# Patient Record
Sex: FEMALE | Race: WHITE | NOT HISPANIC OR LATINO | Employment: OTHER | URBAN - METROPOLITAN AREA
[De-identification: names, ages, dates, MRNs, and addresses within clinical notes are randomized per-mention and may not be internally consistent; named-entity substitution may affect disease eponyms.]

---

## 2023-10-17 ENCOUNTER — ANCILLARY PROCEDURE (OUTPATIENT)
Dept: RADIOLOGY | Facility: CLINIC | Age: 68
End: 2023-10-17
Payer: COMMERCIAL

## 2023-10-17 DIAGNOSIS — M81.0 AGE-RELATED OSTEOPOROSIS WITHOUT CURRENT PATHOLOGICAL FRACTURE: ICD-10-CM

## 2023-10-17 PROCEDURE — 77080 DXA BONE DENSITY AXIAL: CPT

## 2023-10-17 PROCEDURE — 77080 DXA BONE DENSITY AXIAL: CPT | Performed by: RADIOLOGY

## 2023-10-22 PROBLEM — E78.2 HYPERLIPIDEMIA, MIXED: Status: ACTIVE | Noted: 2018-01-31

## 2023-10-22 PROBLEM — K76.0 FATTY LIVER: Status: ACTIVE | Noted: 2019-08-12

## 2023-10-22 PROBLEM — M17.11 ARTHRITIS OF KNEE, RIGHT: Status: ACTIVE | Noted: 2023-10-22

## 2023-10-22 PROBLEM — H35.3131 EARLY DRY STAGE NONEXUDATIVE AGE-RELATED MACULAR DEGENERATION OF BOTH EYES: Status: ACTIVE | Noted: 2023-10-06

## 2023-10-22 PROBLEM — E53.8 B12 DEFICIENCY: Status: ACTIVE | Noted: 2017-03-20

## 2023-10-22 PROBLEM — R73.09 ELEVATED HEMOGLOBIN A1C: Status: ACTIVE | Noted: 2018-01-25

## 2023-10-22 PROBLEM — H90.3 SENSORINEURAL HEARING LOSS, ASYMMETRICAL: Status: ACTIVE | Noted: 2022-06-20

## 2023-10-22 PROBLEM — C44.41 BASAL CELL CARCINOMA OF SKIN OF SCALP AND NECK: Status: ACTIVE | Noted: 2023-08-11

## 2023-10-22 PROBLEM — F41.1 GAD (GENERALIZED ANXIETY DISORDER): Status: ACTIVE | Noted: 2017-01-30

## 2023-10-22 PROBLEM — M65.4 DE QUERVAIN'S DISEASE (TENOSYNOVITIS): Status: ACTIVE | Noted: 2019-02-01

## 2023-10-22 PROBLEM — S83.249A TEAR OF MEDIAL MENISCUS OF KNEE: Status: ACTIVE | Noted: 2023-10-22

## 2023-10-22 PROBLEM — M85.80 OSTEOPENIA, SENILE: Status: ACTIVE | Noted: 2017-03-20

## 2023-10-22 PROBLEM — Z85.820 HISTORY OF MELANOMA: Status: ACTIVE | Noted: 2017-01-30

## 2023-10-22 RX ORDER — CYANOCOBALAMIN 1000 UG/ML
1000 INJECTION, SOLUTION INTRAMUSCULAR; SUBCUTANEOUS
COMMUNITY
Start: 2019-09-17

## 2023-10-22 RX ORDER — FLUTICASONE PROPIONATE AND SALMETEROL 250; 50 UG/1; UG/1
1 POWDER RESPIRATORY (INHALATION) 2 TIMES DAILY PRN
COMMUNITY
Start: 2019-02-19

## 2023-10-22 RX ORDER — PITAVASTATIN CALCIUM 2.09 MG/1
1 TABLET, FILM COATED ORAL
COMMUNITY
Start: 2021-09-10

## 2023-10-22 RX ORDER — SIMVASTATIN 20 MG/1
20 TABLET, FILM COATED ORAL NIGHTLY
COMMUNITY
Start: 2023-08-13

## 2023-10-22 RX ORDER — FEXOFENADINE HCL AND PSEUDOEPHEDRINE HCI 60; 120 MG/1; MG/1
TABLET, EXTENDED RELEASE ORAL
COMMUNITY

## 2023-10-22 RX ORDER — PHENAZOPYRIDINE HYDROCHLORIDE 200 MG/1
TABLET, FILM COATED ORAL
COMMUNITY
Start: 2019-04-11

## 2023-10-22 RX ORDER — ENALAPRIL MALEATE 20 MG/1
20 TABLET ORAL
COMMUNITY
Start: 2019-01-22

## 2023-10-22 RX ORDER — AMLODIPINE BESYLATE 5 MG/1
5 TABLET ORAL
COMMUNITY
Start: 2019-12-14

## 2023-10-22 RX ORDER — ATORVASTATIN CALCIUM 20 MG/1
TABLET, FILM COATED ORAL
COMMUNITY
Start: 2018-10-22

## 2023-10-22 RX ORDER — ESOMEPRAZOLE MAGNESIUM 40 MG/1
40 CAPSULE, DELAYED RELEASE ORAL
COMMUNITY
Start: 2018-10-22

## 2023-10-22 RX ORDER — MONTELUKAST SODIUM 10 MG/1
1 TABLET ORAL NIGHTLY
COMMUNITY
Start: 2019-11-18

## 2023-10-22 RX ORDER — OXYBUTYNIN CHLORIDE 5 MG/1
TABLET, EXTENDED RELEASE ORAL
COMMUNITY
Start: 2019-12-09

## 2023-10-22 RX ORDER — MONTELUKAST SODIUM 4 MG/1
1 TABLET, CHEWABLE ORAL 2 TIMES DAILY
COMMUNITY
Start: 2019-11-21

## 2023-10-22 RX ORDER — NEEDLES, FILTER 19GX1 1/2"
NEEDLE, DISPOSABLE MISCELLANEOUS
COMMUNITY
Start: 2023-09-06

## 2023-10-22 RX ORDER — ASPIRIN 81 MG/1
81 TABLET ORAL
COMMUNITY

## 2023-10-22 RX ORDER — ESCITALOPRAM OXALATE 10 MG/1
10 TABLET ORAL DAILY
COMMUNITY
Start: 2019-12-30

## 2023-10-22 RX ORDER — ALBUTEROL SULFATE 90 UG/1
AEROSOL, METERED RESPIRATORY (INHALATION)
COMMUNITY

## 2023-10-22 RX ORDER — MINERAL OIL
180 ENEMA (ML) RECTAL
COMMUNITY
Start: 2014-04-03

## 2023-10-22 RX ORDER — NITROFURANTOIN 25; 75 MG/1; MG/1
CAPSULE ORAL
COMMUNITY
Start: 2019-04-11

## 2023-10-22 RX ORDER — LEVALBUTEROL TARTRATE 45 UG/1
1-2 AEROSOL, METERED ORAL EVERY 4 HOURS PRN
COMMUNITY
Start: 2021-01-15

## 2023-10-22 RX ORDER — ACETAMINOPHEN AND PHENYLEPHRINE HCL 325; 5 MG/1; MG/1
TABLET ORAL
COMMUNITY

## 2023-10-22 RX ORDER — FLUTICASONE PROPIONATE 50 MCG
2 SPRAY, SUSPENSION (ML) NASAL DAILY
COMMUNITY
Start: 2018-10-22

## 2023-10-22 RX ORDER — CLOBETASOL PROPIONATE 0.46 MG/ML
SOLUTION TOPICAL
COMMUNITY
Start: 2019-09-09

## 2023-10-24 ENCOUNTER — EVALUATION (OUTPATIENT)
Dept: PHYSICAL THERAPY | Facility: CLINIC | Age: 68
End: 2023-10-24
Payer: COMMERCIAL

## 2023-10-24 VITALS — OXYGEN SATURATION: 96 % | DIASTOLIC BLOOD PRESSURE: 80 MMHG | SYSTOLIC BLOOD PRESSURE: 132 MMHG | HEART RATE: 78 BPM

## 2023-10-24 DIAGNOSIS — M76.891 OTHER SPECIFIED ENTHESOPATHIES OF RIGHT LOWER LIMB, EXCLUDING FOOT: ICD-10-CM

## 2023-10-24 PROCEDURE — 97530 THERAPEUTIC ACTIVITIES: CPT | Mod: GP

## 2023-10-24 PROCEDURE — 97162 PT EVAL MOD COMPLEX 30 MIN: CPT | Mod: GP

## 2023-10-24 ASSESSMENT — ACTIVITIES OF DAILY LIVING (ADL): ADL_ASSISTANCE: INDEPENDENT

## 2023-10-24 ASSESSMENT — PAIN - FUNCTIONAL ASSESSMENT: PAIN_FUNCTIONAL_ASSESSMENT: 0-10

## 2023-10-24 ASSESSMENT — PAIN SCALES - GENERAL: PAINLEVEL_OUTOF10: 2

## 2023-10-24 ASSESSMENT — PAIN DESCRIPTION - DESCRIPTORS: DESCRIPTORS: SHARP;DULL

## 2023-10-24 NOTE — PROGRESS NOTES
Physical Therapy    Physical Therapy Evaluation and Treatment      Patient Name: Charo Fleming  MRN: [id number]  Today's Date: 10/24/2023  Time Calculation  Start Time: 0840  Stop Time: 0920  Time Calculation (min): 40 min      Assessment:  67yr F pt presents to physical therapy with Complaints of increasing R Hip pain, L Sciatica, and occasional low back pain. During examination patient has deficits in B LE strength, pain with ROM, gait dysfunction, stair dysfunction, dynamic balance, impaired functional mobility and tolerance with activity. pt would benefit from continued skilled physical therapy to maximize pt safety, increase tolerance to activity and to address impairments to restore PLOF with ADLs, functional mobility and participation in activities.    PT Assessment Results: Decreased strength, Decreased endurance, Impaired balance, Decreased mobility, Decreased coordination, Pain  Rehab Prognosis: Good  Evaluation/Treatment Tolerance: Patient tolerated treatment well  Strengths: Ability to acquire knowledge    Plan:  Treatment/Interventions: Biofeedback, Cryotherapy, Education/ Instruction, Gait training, Manual therapy, Neuromuscular re-education, Self care/ home management, Therapeutic activities, Therapeutic exercises  PT Plan: Skilled PT, Other (Comment) (Start on Proximal Hip Strengthening; HEP for Hip; Sciatica Nerve Stretch/Glides, Pirformis Stretch, Nustep)  PT Frequency: 2 times per week  Duration: 4  Rehab Potential: Good  Plan of Care Agreement: Patient  Visit #1   Insurance Reviewed (per information provided by  pre-cert team)  Authorization required:  No   Preferred Name:  Charo     Current Problem:   1. Other specified enthesopathies of right lower limb, excluding foot  Referral to Physical Therapy    Follow Up In Physical Therapy          Subjective    General:  General  Reason for Referral: R Hip Pain  Referred By: MD Brett  Past Medical History Relevant to Rehab: CA Melanoma, HTN,  Asthma, CVA, HA, Migraines,  Preferred Learning Style: visual, verbal, written  General Comment: Reports when she was walking she has R/L hip pain for some time with walking but recently in October she has had increased level of pain in R LE and then a week after she saw her PCP (10/6/23) she stated the sciatic nerve started to act up in the L Hip. No falls in the last 6 months. At worst 9/10 pain with climbing the staris; later in the day; at its best 1-2/10 pain level. 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  Precautions:  Precautions  Medical Precautions: No known precautions/limitation  Vital Signs:  Vital Signs  Heart Rate: 78  Heart Rate Source: Monitor  SpO2: 96 %  BP: 132/80  BP Location: Right arm  BP Method: Automatic  Patient Position: Sitting  Pain:  Pain Assessment  Pain Assessment: 0-10  Pain Score: 2  Pain Type: Chronic pain  Pain Location: Hip  Pain Orientation: Right  Pain Descriptors: Sharp, Dull  Pain Frequency: Constant/continuous  Clinical Progression: Not changed  Home Living:   Split Level Home: 1 step to enter the home; 6 steps in home; bathroom is on main level; bedroom is up 6 steps; laundry is 6 steps to basement.   Prior Level of Function:  Prior Function Per Pt/Caregiver Report  Level of [place]: Independent with ADLs and functional transfers, Independent with homemaking with ambulation  Receives Help From: Family, Other (Comment) (Able to assist as needed)  ADL Assistance: Independent  Homemaking Assistance: Independent  Ambulatory Assistance: Independent  Vocational: Retired  Leisure: Takes care of [employer]  Hand Dominance: Right  Prior Function Comments: IND    Objective   General Assessments:  Posture Comment: Fwd head, rounded shoulders, increased  thoracic kyphosis    Functional Assessments:  Gait Comment: Ambulates for 150+ ft with a mild antalgic gait; slow allie, decreased push off and heel strike on R LE. No AD used.    , Stairs Comment: Ascends/Descends steps with BL use of HR with a step over step pattern; slow allie  , Bed Mobility Comment: IND  , and Transfers Comment: IND  Extremity/Trunk Assessments:  RLE   RLE : Exceptions to WFL  Strength RLE  R Hip Flexion: 4+/5  R Hip Extension: 4/5  R Hip ABduction: 4-/5  R Hip ADduction: 4-/5  R Knee Flexion: 4+/5  R Knee Extension: 4+/5  R Ankle Dorsiflexion: 5/5  R Ankle Plantar Flexion: 5/5  LLE   LLE : Exceptions to WFL  Strength LLE  L Hip Flexion: 4+/5  L Hip Extension: 4/5  L Hip ABduction: 4-/5  L Hip ADduction: 4-/5  L Knee Flexion: 4+/5  L Knee Extension: 4+/5  L Ankle Dorsiflexion: 5/5  L Ankle Plantar Flexion: 5/5    HIP  Hip Palpation/Joint Mobility Assessment  Palpation / Joint Mobility Comment: L Side Hip; Greater Trochanter region tender with palpation; R Side no tenderness  Lumbar AROM WFL unless documented below  Lumbar AROM WFL: no  Lumbar flexion: (60°): WNL; Pain with flexion of L/S in the L Hip  Lumbar extension (25°): WNL Motion: Pain on the L Hip/Glutes  Lumbar rotation right (30°): WNL  Lumbar roation left (30°): WNL  Lumbar sidebend right (25°): WNL  Lumbar sidebend left (25°): WNL  Hip AROM WFL unless documented below     Hip PROM WFL unless documented below     Specific Lower Extremity MMT WFL unless documented below  Specific Lower Extremity MMT WFL: no  R Gluteals (prone): (5/5): 4/5  L Gluteals (prone): (5/5): 4/5  R Hip External Rotation: (5/5): 4-/5  L Hip External Rotation: (5/5): 4-/5    Special Tests Negative unless documented below  Special Tests Negative: no  Supine SLR: (Negative): (+) on L LE >90 degrees  NASRA: (Negative): (+) BL  Other: Scour Test (-) BL       Outcome Measures:  FGA - Functional Gait Assessment  Gait level surface: 3  Change in gait speed: 3  Gait  with horizontal head turns: 3  Gait with vertical head turns: 3  Gait and pivot turn: 3  Step over obstacle: 3  Gait with narrow base of support: 2  Gait with eyes closed: 3  Ambulating backwards: 3  Steps: 2  FGA Total Score: 28    Timed Up and Go Test  TUG Comment: 5.83, no AD    Other Measures  5x Sit to Stand: 10.97s no UE assist; Standardized chair  Functional Gait Assessment (FGA): 28/30  Lower Extremity Funtional Score (LEFS): 36/80     Treatments:  Therapeutic Activity  Therapeutic Activity Performed: Yes (20 minutes)  1. Repeated sit to/from stands from standardized chair height. Required VC for no use of hands, nose over toes, full upright stand from chair, use of anterior shift with fwd momentum and eccentric control into chair.  2. Functional Performance via gait analysis of TUG: Verbal cues for sequencing/positioning. 2x10' at SBA.   3. Functional mobility via AROM/PROM of LE; Verbal cues for sequencing/positioning.  4. Functional Performance via MMT of LE: Hip, knee, ankle; Verbal cues for sequencing/positioning.   5. Functional performance via Stairs: Ascends/Descends 4 6in steps with UL of HR   6. Educated pt on POC, goals of physical therapy, purpose of physical therapy, as well as the benefits in participating in physical therapy to increase functional mobility and maximize pt safety.     OP EDUCATION:  Education  Individual(s) Educated: Patient  Education Provided: Anatomy, Fall Risk, Body Mechanics, Home Exercise Program, POC, Posture  Risk and Benefits Discussed with Patient/Caregiver/Other: yes  Patient/Caregiver Demonstrated Understanding: yes  Plan of Care Discussed and Agreed Upon: yes  Patient Response to Education: Patient/Caregiver Verbalized Understanding of Information, Patient/Caregiver Performed Return Demonstration of Exercises/Activities, Patient/Caregiver Asked Appropriate Questions    Goals: 10/24/23  STG: pt will be independent in HEP & symptom management    LTGs:  Pain: pt will  demonstrate a 2 pt improvement for hip pain on the VAS scale, allowing for improved tolerance to perform daily functional activities.     ROM: pt will demonstrate no losses in hip AROM without onset of pain, allowing for a normal gait pattern.     Strength: Pt will improve B LE Strength to >/= 4+/5 to increase functional performance, tolerance to activity, and enhancing pt safety to particpate in ADLs and IADLs.    Gait: pt will demonstrate normal mechanics without pain during gait and performance of stairs, allowing for pt to return to navigating the community.     LEFS: pt will improve LEFS score by at least 9 points (minimal clinically important difference) in order to reflect increased ease in completing ADL's/IADL's.  Baseline 36/80    FGA: pt will increase FGA to >/= 23/30 to decrease fall risk and improve safety/IND at home. The Functional Gait Assessment(FGA) is 10-item test that assesses dynamic balance and postural stability during gait.  It is used to assessed the following diagnoses: Stroke, Parkinson's Disease, SCI's, Vestibular Disorders, and Geriatrics.  A score of < 22/30 indicates Increased fall risk. Baseline 28/30    TUG: pt will complete TUG within 12 seconds or less (indicative of higher fall risk) in order to INC balance and enhance safety during ADLs/ IADLs. (> or equal to 12 seconds indicates high risk for falls in older adults. 11-20 seconds is normal for the frail and elderly.) OR MCID 3.4s Baseline 5.83sec    5xSTS: pt will perform 5x sit to  < 15 seconds to decrease fall risk. OR MCID 2.3s Baseline 10.97sec    Stairs: pt will ascend/descend step over step (6in step) with proper gait mechanics and use of <1HR to promote functional mobility and improve functional performance.

## 2023-10-24 NOTE — LETTER
October 24, 2023     Patient: Charo Fleming   YOB: 1955   Date of Visit: 10/24/2023       To Whom It May Concern:    It is my medical opinion that Charo Fleming {Work release (duty restriction):33480}.    If you have any questions or concerns, please don't hesitate to call.         Sincerely,        Mei Warner, PT    CC: No Recipients

## 2023-10-24 NOTE — LETTER
October 24, 2023     Patient: Charo Fleming   YOB: 1955   Date of Visit: 10/24/2023       To Whom it May Concern:    Charo Fleming was seen in my clinic on 10/24/2023. She {Return to school/sport:52688}.    If you have any questions or concerns, please don't hesitate to call.         Sincerely,          Mei Warner, PT        CC: No Recipients

## 2023-10-27 ENCOUNTER — TREATMENT (OUTPATIENT)
Dept: PHYSICAL THERAPY | Facility: CLINIC | Age: 68
End: 2023-10-27
Payer: COMMERCIAL

## 2023-10-27 DIAGNOSIS — M76.891 OTHER SPECIFIED ENTHESOPATHIES OF RIGHT LOWER LIMB, EXCLUDING FOOT: ICD-10-CM

## 2023-10-27 PROCEDURE — 97110 THERAPEUTIC EXERCISES: CPT | Mod: GP

## 2023-10-27 ASSESSMENT — PAIN - FUNCTIONAL ASSESSMENT: PAIN_FUNCTIONAL_ASSESSMENT: 0-10

## 2023-10-27 ASSESSMENT — PAIN DESCRIPTION - DESCRIPTORS: DESCRIPTORS: SHARP

## 2023-10-27 ASSESSMENT — PAIN SCALES - GENERAL: PAINLEVEL_OUTOF10: 5 - MODERATE PAIN

## 2023-10-27 NOTE — PROGRESS NOTES
Physical Therapy    Physical Therapy Progress Note    Patient Name: Charo Fleming  MRN: [id number]  Today's Date: 10/27/2023  Time Calculation  Start Time: 1103  Stop Time: 1147  Time Calculation (min): 44 min      Assessment:  PT Assessment  PT Assessment Results: Decreased strength, Decreased endurance, Impaired balance, Decreased mobility, Decreased coordination, Pain  Rehab Prognosis: Good  Evaluation/Treatment Tolerance: Patient tolerated treatment well  pt tolerated treatment well, did well with initiation of HEP and Supine Proximal Hip Strengthening. Minimal pain with SLR during exercises but manageable. pt needs continued work on increasing tolerance with activity, dynamic hip strengthening. pt left session with all questions answered.     Plan:  OP PT Plan  Treatment/Interventions: Biofeedback, Cryotherapy, Education/ Instruction, Gait training, Manual therapy, Neuromuscular re-education, Self care/ home management, Therapeutic activities, Therapeutic exercises  PT Plan: Skilled PT, Other (Comment)  PT Frequency: 2 times per week  Duration: 4  Rehab Potential: Good  Plan of Care Agreement: Patient  Visit #2  Insurance Reviewed (per information provided by  pre-cert team)  Authorization required:  No   Preferred Name:  Charo     Current Problem  1. Other specified enthesopathies of right lower limb, excluding foot  Follow Up In Physical Therapy          Subjective   General  Reason for Referral: R Hip Pain  Referred By: MD Brett  Past Medical History Relevant to Rehab: CA Melanoma, HTN, Asthma, CVA, HA, Migraines,  Preferred Learning Style: visual, verbal, written  General Comment: Reports after last session she iced as recomended and was a little flared up; reports the grandkids were over she was tryingto work on better body mechanics but was going up and down a lot of stairs which caused an increased flare up. reports she is already startting to feel it today since she has been up for a  while.  Precautions  Precautions  Medical Precautions: No known precautions/limitation    Pain  Pain Assessment: 0-10  Pain Score: 5 - Moderate pain  Pain Type: Chronic pain  Pain Location: Hip  Pain Orientation: Right, Left  Pain Descriptors: Sharp  Pain Frequency: Constant/continuous  Clinical Progression: Not changed    Objective   Posture  Posture Comment: Rounded shoulders, fwd head, increased thoracic kyphosis    Gait: Ambulates for 50+ ft with a mild antalgic gait; slow allie, decreased push off and heel strike on R LE. No AD used     Treatments:  Therapeutic Exercise  Therapeutic Exercise Performed: Yes  Therapeutic Exercise Activity 1: Nustep work level 3 to increase functional mobility and increase tolerance with activity  Therapeutic Exercise Activity 2: Supine Bridges 3x10  Therapeutic Exercise Activity 3: Adductor Ball Squeezes 2x15; 2-3s hold  Therapeutic Exercise Activity 4: SLR BL 2x15  Therapeutic Exercise Activity 5: PPT 3x10  Therapeutic Exercise Activity 6: Clamshells BL 2x15  Therapeutic Exercise Activity 7: SL Hip BL ABD 2x15  Therapeutic Exercise Activity 8: Slant Board Stretch 2x20s  Therapeutic Exercise Activity 9: HS Stretch with Steps 2x30s    OP EDUCATION:  Education  Individual(s) Educated: Patient  Education Provided: Anatomy, Fall Risk, Body Mechanics, Home Exercise Program, POC, Posture  Risk and Benefits Discussed with Patient/Caregiver/Other: yes  Patient/Caregiver Demonstrated Understanding: yes  Plan of Care Discussed and Agreed Upon: yes  Patient Response to Education: Patient/Caregiver Verbalized Understanding of Information, Patient/Caregiver Performed Return Demonstration of Exercises/Activities, Patient/Caregiver Asked Appropriate Questions    HEP  Access Code: [email or web address]  URL: [email or web address]  Date: 10/27/2023  Prepared by: Mei Warner    Exercises  - Supine Posterior Pelvic Tilt  - 1 x daily - 3 sets - 10 reps  - Supine Bridge  - 1 x daily - 3  sets - 10 reps  - Clamshell  - 1 x daily - 3 sets - 10 reps  - Sidelying Hip Abduction  - 1 x daily - 3 sets - 10 reps  - Supine Active Straight Leg Raise  - 1 x daily - 3 sets - 10 reps  - Supine Hip Adduction Isometric with Ball  - 1 x daily - 3 sets - 10 reps    Billing:Minutes  Treatment:   Therapeutic Exercise:  40

## 2023-10-31 ENCOUNTER — TREATMENT (OUTPATIENT)
Dept: PHYSICAL THERAPY | Facility: CLINIC | Age: 68
End: 2023-10-31
Payer: COMMERCIAL

## 2023-10-31 DIAGNOSIS — M76.891 OTHER SPECIFIED ENTHESOPATHIES OF RIGHT LOWER LIMB, EXCLUDING FOOT: ICD-10-CM

## 2023-10-31 PROCEDURE — 97110 THERAPEUTIC EXERCISES: CPT | Mod: GP

## 2023-10-31 ASSESSMENT — PAIN DESCRIPTION - DESCRIPTORS: DESCRIPTORS: SHARP

## 2023-10-31 ASSESSMENT — PAIN SCALES - GENERAL: PAINLEVEL_OUTOF10: 7

## 2023-10-31 ASSESSMENT — PAIN - FUNCTIONAL ASSESSMENT: PAIN_FUNCTIONAL_ASSESSMENT: 0-10

## 2023-10-31 NOTE — PROGRESS NOTES
Physical Therapy    Physical Therapy Progress Note    Patient Name: Charo Fleming  MRN: [id number]  Today's Date: 10/31/2023  Time Calculation  Start Time: 1445  Stop Time: 1525  Time Calculation (min): 40 min  Billing: Minutes  Treatment:   Therapeutic Exercise:  38    Assessment:  PT Assessment  PT Assessment Results: Decreased strength, Decreased endurance, Impaired balance, Decreased mobility, Decreased coordination, Pain  Rehab Prognosis: Good  Evaluation/Treatment Tolerance: Patient tolerated treatment well  pt tolerated treatment well, did well with supine mat table exercises and new additional stretches to the LE . pt needs continued work on increasing tolerance with activity, gait, and LE strength. pt left session with all questions answered.     Plan:  OP PT Plan  Treatment/Interventions: Biofeedback, Cryotherapy, Education/ Instruction, Gait training, Manual therapy, Neuromuscular re-education, Self care/ home management, Therapeutic activities, Therapeutic exercises  PT Plan: Skilled PT, Other (Comment) (Continue to work on proximal hip muscle strength and progress to standing if tolerated and no flare ups since last visit)  PT Frequency: 2 times per week  Duration: 4  Rehab Potential: Good  Plan of Care Agreement: Patient  Visit #3  Insurance Reviewed (per information provided by  pre-cert team)  Authorization required:  No   Preferred Name:  Charo     Current Problem  1. Other specified enthesopathies of right lower limb, excluding foot  Follow Up In Physical Therapy          Subjective   General  Reason for Referral: R Hip Pain  Referred By: MD Brett  Past Medical History Relevant to Rehab: CA Melanoma, HTN, Asthma, CVA, HA, Migraines,  Preferred Learning Style: visual, verbal, written  General Comment: Reports her hip felt the best its been yesterday and then she was going up and down stairs and taking care of grandkids which caused a flare up; reports today she has pain and thinks it is  because she has been walking and on her feet today.  Precautions  Precautions  Medical Precautions: No known precautions/limitation  Pain  Pain Assessment: 0-10  Pain Score: 7  Pain Type: Chronic pain, Neuropathic pain  Pain Location: Hip  Pain Orientation: Left (Sciatica)  Pain Descriptors: Sharp  Pain Frequency: Constant/continuous    Objective   Posture  Posture Comment: Rounded shoulders, fwd head, increased thoracic kyphosis    Gait: Ambulates in clinic for 50+ ft with a antalgic gait pattern; decreased loading on the L LE; decreased psuh off and initial contact on the L LE; R trunk lean minimal to ensure proper clearance during swing.     Treatments:  Therapeutic Exercise  Therapeutic Exercise Performed: Yes  Therapeutic Exercise Activity 1: Nustep work level 3 to increase functional mobility and increase tolerance with activity  Therapeutic Exercise Activity 2: Supine Bridges 2x15  Therapeutic Exercise Activity 3: Adductor Ball Squeezes 2x15; 2-3s hold  Therapeutic Exercise Activity 4: SLR BL 2x15  Therapeutic Exercise Activity 5: PPT x20  Therapeutic Exercise Activity 6: Long Sit Hip Flexion BL 3x5  Therapeutic Exercise Activity 7: Pirformis Fig 4 Stretch BL 2x30s  Therapeutic Exercise Activity 8: Supine Nerve Glide Sciatic: L LE x10  Therapeutic Exercise Activity 9: Supine HS Stretch with Steps  R LE 3x30s  Therapeutic Exercise Activity 10: Gastroc/Soleus Stretch BL 2x30s    OP EDUCATION:  Education  Individual(s) Educated: Patient  Education Provided: Anatomy, Fall Risk, Body Mechanics, Home Exercise Program, POC, Posture  Risk and Benefits Discussed with Patient/Caregiver/Other: yes  Patient/Caregiver Demonstrated Understanding: yes  Plan of Care Discussed and Agreed Upon: yes  Patient Response to Education: Patient/Caregiver Verbalized Understanding of Information, Patient/Caregiver Performed Return Demonstration of Exercises/Activities, Patient/Caregiver Asked Appropriate Questions    HEP  Access Code:  [email or web address]  URL: [email or web address]  Date: 10/31/2023  Prepared by: Mei Warner    Exercises  - Supine Piriformis Stretch with Foot on Ground  - 1 x daily - 7 x weekly - 3 sets - 10 reps  - Supine Sciatic Nerve Glide  - 1 x daily - 1-2 sets - 10 reps  - Supine Hamstring Stretch with Strap  - 1 x daily - 3-5 sets - 20-30s hold

## 2023-11-03 ENCOUNTER — TREATMENT (OUTPATIENT)
Dept: PHYSICAL THERAPY | Facility: CLINIC | Age: 68
End: 2023-11-03
Payer: COMMERCIAL

## 2023-11-03 DIAGNOSIS — M76.891 OTHER SPECIFIED ENTHESOPATHIES OF RIGHT LOWER LIMB, EXCLUDING FOOT: ICD-10-CM

## 2023-11-03 PROCEDURE — 97110 THERAPEUTIC EXERCISES: CPT | Mod: GP

## 2023-11-03 ASSESSMENT — PAIN DESCRIPTION - DESCRIPTORS: DESCRIPTORS: SHARP

## 2023-11-03 ASSESSMENT — PAIN SCALES - GENERAL: PAINLEVEL_OUTOF10: 4

## 2023-11-03 ASSESSMENT — PAIN - FUNCTIONAL ASSESSMENT: PAIN_FUNCTIONAL_ASSESSMENT: 0-10

## 2023-11-03 NOTE — PROGRESS NOTES
Physical Therapy    Physical Therapy Progress Note    Patient Name: Charo Fleming  MRN: [id number]  Today's Date: 11/3/2023  Time Calculation  Start Time: 0800  Stop Time: 0838  Time Calculation (min): 38 min  Billing: Minutes   Treatment:   Therapeutic Exercise:  38    Assessment:  PT Assessment  PT Assessment Results: Decreased strength, Decreased endurance, Impaired balance, Decreased mobility, Decreased coordination, Pain  Rehab Prognosis: Good  Evaluation/Treatment Tolerance: Patient tolerated treatment well  pt tolerated treatment well, did well with progression of strengthening hip proximal exercises. pt needs continued work on hip strengthening as seen with difficulty with hip flexion in long sitting. pt left session with all questions answered.     Plan:  OP PT Plan  Treatment/Interventions: Biofeedback, Cryotherapy, Education/ Instruction, Gait training, Manual therapy, Neuromuscular re-education, Self care/ home management, Therapeutic activities, Therapeutic exercises  PT Plan: Skilled PT, Other (Comment) Work on Standing Proximal Hip Exercises; Slider Drills if tolerable; step ups, hip hikes  PT Frequency: 2 times per week  Duration: 4  Rehab Potential: Good  Plan of Care Agreement: Patient  Visit #4  Insurance Reviewed (per information provided by  pre-cert team)  Authorization required:  No   Preferred Name:  Charo       Current Problem  1. Other specified enthesopathies of right lower limb, excluding foot  Follow Up In Physical Therapy          Subjective   General  Reason for Referral: R Hip Pain  Referred By: MD Brett  Past Medical History Relevant to Rehab: CA Melanoma, HTN, Asthma, CVA, HA, Migraines,  Preferred Learning Style: visual, verbal, written  General Comment: Reports her hip was not too bad yesterday. Her hip hurt really bad after she was sitting in the car for 40 minutes and had increased pain but it worked itself out once she started to move  again.  Precautions  Precautions  Medical Precautions: No known precautions/limitation    Pain  Pain Assessment: 0-10  Pain Score: 4  Pain Type: Chronic pain, Neuropathic pain  Pain Location: Hip  Pain Orientation: Left  Pain Descriptors: Sharp  Pain Frequency: Constant/continuous    Objective   Posture  Posture Comment: Rounded shoulders, fwd head, increased thoracic kyphosis    Gait: Ambulates with a minimal antalgic gait, decreased allie, decreased push off on the L LE.     Treatments:  Therapeutic Exercise  Therapeutic Exercise Performed: Yes  Therapeutic Exercise Activity 1: Nustep work level 3 to increase functional mobility and increase tolerance with activity; 8 minutes  Therapeutic Exercise Activity 2: Supine Bridges 2x10  Therapeutic Exercise Activity 3: Adductor Ball Squeezes 2x15; 2-3s hold  Therapeutic Exercise Activity 4: SLR BL 2x15  Therapeutic Exercise Activity 5: PPT 2x20  Therapeutic Exercise Activity 6: Long Sit Hip Flexion BL 2x10; red ball as tactile cue  Therapeutic Exercise Activity 7: Pirformis Fig 4 Stretch BL 2x30s  Therapeutic Exercise Activity 8: Mini Squats at //bar 2x15  Therapeutic Exercise Activity 9: Stair HS Stretch BL 2x30s  Therapeutic Exercise Activity 10: Gastroc/Soleus Stretch BL 2x30s  Therapeutic Exercise Activity 11: SL Hip Circles x10 CW, x10 CCW    OP EDUCATION:  Education  Individual(s) Educated: Patient  Education Provided: Anatomy, Fall Risk, Body Mechanics, Home Exercise Program, POC, Posture  Risk and Benefits Discussed with Patient/Caregiver/Other: yes  Patient/Caregiver Demonstrated Understanding: yes  Plan of Care Discussed and Agreed Upon: yes  Patient Response to Education: Patient/Caregiver Verbalized Understanding of Information, Patient/Caregiver Performed Return Demonstration of Exercises/Activities, Patient/Caregiver Asked Appropriate Questions    HEP  Access Code: [email or web address]  URL: [email or web address]  Date: 11/03/2023  Prepared by:  Mei Warner    Exercises  - Sidelying Hip Circles  - 1 x daily - 7 x weekly - 3 sets - 10 reps

## 2023-11-08 ENCOUNTER — TREATMENT (OUTPATIENT)
Dept: PHYSICAL THERAPY | Facility: CLINIC | Age: 68
End: 2023-11-08
Payer: COMMERCIAL

## 2023-11-08 DIAGNOSIS — M76.891 OTHER SPECIFIED ENTHESOPATHIES OF RIGHT LOWER LIMB, EXCLUDING FOOT: ICD-10-CM

## 2023-11-08 PROCEDURE — 97110 THERAPEUTIC EXERCISES: CPT | Mod: GP,CQ

## 2023-11-08 ASSESSMENT — PAIN - FUNCTIONAL ASSESSMENT: PAIN_FUNCTIONAL_ASSESSMENT: 0-10

## 2023-11-08 ASSESSMENT — PAIN SCALES - GENERAL: PAINLEVEL_OUTOF10: 4

## 2023-11-08 ASSESSMENT — PAIN DESCRIPTION - DESCRIPTORS: DESCRIPTORS: SHARP

## 2023-11-08 NOTE — PROGRESS NOTES
Physical Therapy    Physical Therapy Progress Note    Patient Name: Charo Fleming  MRN: [id number]  Today's Date: 11/8/2023  Time Calculation  Start Time: 0915  Stop Time: 1010  Time Calculation (min): 55 min  Billing: Minutes   Treatment:   Therapeutic Exercise:  45  CP 10'/NB    Assessment:   Progressed patient with resistance added to clamshells and hooklying abduction.  Reviewed PPT technique and maintaining core engagement with supine DLS ex.  Challenged with long sit SLR, but working on at home.  V/c for proper form with squats in efforts to isolate target musculature specific to the goal of each ex.  Fatigued at end of session.  Relief with CP  Continues to require skilled PT to work on deficits with mobility, strength, endurance, and gait/stair activity.    Skilled Care;  Therapeutic exercise progression, patient education    Plan:  OP PT Plan  Treatment/Interventions: Biofeedback, Cryotherapy, Education/ Instruction, Gait training, Manual therapy, Neuromuscular re-education, Self care/ home management, Therapeutic activities, Therapeutic exercises  Add eccentric sliders and step ups next visit.     PT Frequency: 2 times per week  Duration: 4  Rehab Potential: Good  Plan of Care Agreement: Patient  Visit #5  Insurance Reviewed (per information provided by  pre-cert team)  Authorization required:  No   Preferred Name:  Charo       Current Problem  1. Other specified enthesopathies of right lower limb, excluding foot  Follow Up In Physical Therapy          Subjective   Reports Sunday her R. Hip flared up where she could not ambulate up the stairs.  States she can descend steps without pain, but ascending always hurts.    Reason for Referral: R Hip Pain  Referred By: MD Brett  Past Medical History Relevant to Rehab: CA Melanoma, HTN, Asthma, CVA, HA, Migraines,  Precautions   Medical Precautions: No known precautions/limitation     Pain  Pain Assessment: 0-10  Pain Score: 4  Pain Type: Chronic pain  Pain  "Location: Hip  Pain Orientation: Right, Left  Pain Descriptors: Sharp  R. Lateral hip 3/10, L. Sciatic pain 4/10, radiating down posterior L. hamstring    Objective   Posture:  Forward head, rounded shoulders  Gait: Ambulates with a minimal antalgic gait, decreased allie, decreased push off on the L LE.     Treatments:  Therapeutic Exercise  Therapeutic Exercise Performed: Yes  Therapeutic Exercise Activity 1: Nustep work level 3 to increase functional mobility and increase tolerance with activity; 8 minutes  Therapeutic Exercise Activity 2: Supine Bridges 2x10  Therapeutic Exercise Activity 3: Adductor Ball Squeezes 2x15; 2-3s hold  Therapeutic Exercise Activity 4: SLR BL 2x15  Therapeutic Exercise Activity 5: PPT 2x20  Therapeutic Exercise Activity 6: Long Sit Hip Flexion BL 2x10; red ball as tactile cue  Therapeutic Exercise Activity 7: Pirformis Fig 4 Stretch BL 2x30s  Therapeutic Exercise Activity 8: Mini Squats at //bar 2x15  Therapeutic Exercise Activity 9: Stair HS Stretch BL 2x30s  Therapeutic Exercise Activity 10: Gastroc/Soleus Stretch BL 2x30s  Therapeutic Exercise Activity 11: SL Hip Circles x10 CW, x10 CCW  Piriformis stretch L. LE only with manual assist 20\" x 3  RTB hooklying hip abduction 2 x 10  RTB clamshells 2 x 10 leobardo    Modalities  CP applied to L. Glute, R. Hip  in supine with wedge elevating leobardo LE's x 10' at end of session    OP EDUCATION:   V/c for correct technique and posture with exercises    HEP  Access Code: [email or web address]      "

## 2023-11-10 ENCOUNTER — TREATMENT (OUTPATIENT)
Dept: PHYSICAL THERAPY | Facility: CLINIC | Age: 68
End: 2023-11-10
Payer: COMMERCIAL

## 2023-11-10 DIAGNOSIS — M76.891 OTHER SPECIFIED ENTHESOPATHIES OF RIGHT LOWER LIMB, EXCLUDING FOOT: ICD-10-CM

## 2023-11-10 PROCEDURE — 97112 NEUROMUSCULAR REEDUCATION: CPT | Mod: GP

## 2023-11-10 PROCEDURE — 97110 THERAPEUTIC EXERCISES: CPT | Mod: GP

## 2023-11-10 ASSESSMENT — PAIN - FUNCTIONAL ASSESSMENT: PAIN_FUNCTIONAL_ASSESSMENT: 0-10

## 2023-11-10 ASSESSMENT — PAIN SCALES - GENERAL: PAINLEVEL_OUTOF10: 2

## 2023-11-10 ASSESSMENT — PAIN DESCRIPTION - DESCRIPTORS: DESCRIPTORS: ACHING

## 2023-11-10 NOTE — PROGRESS NOTES
Physical Therapy    Physical Therapy Progress Note    Patient Name: Charo Fleming  MRN: [id number]  Today's Date: 11/10/2023  Time Calculation  Start Time: 0834  Stop Time: 0912  Time Calculation (min): 38 min  Billing:  Treatment:   Therapeutic Exercise:  23  NMR:  15    Assessment:  pt tolerated treatment well, did well with progression of slider drills and step ups/step downs although still challenging. pt needs continued work on increasing proximal hip strength and balance. pt left session with all questions answered.   PT Assessment  PT Assessment Results: Decreased strength, Decreased endurance, Impaired balance, Decreased mobility, Decreased coordination, Pain  Rehab Prognosis: Good  Evaluation/Treatment Tolerance: Patient tolerated treatment well    Plan:  OP PT Plan  Treatment/Interventions: Biofeedback, Cryotherapy, Education/ Instruction, Gait training, Manual therapy, Neuromuscular re-education, Self care/ home management, Therapeutic activities, Therapeutic exercises  PT Plan: Skilled PT, Other (Comment) (Cotninue with progressing core and hip strengthening)  PT Frequency: 2 times per week  Duration: 4  Rehab Potential: Good  Plan of Care Agreement: Patient  Visit #6  Insurance Reviewed (per information provided by  pre-cert team)  Authorization required:  No   Preferred Name:  Charo     Current Problem  1. Other specified enthesopathies of right lower limb, excluding foot  Follow Up In Physical Therapy          Subjective   General  Reason for Referral: R Hip Pain  Referred By: MD Brett  Past Medical History Relevant to Rehab: CA Melanoma, HTN, Asthma, CVA, HA, Migraines,  Preferred Learning Style: visual, verbal, written  General Comment: Reports she went to the chiropracter yesterday and has had minimal sciatic pain since appointment in the L hip. R Hip pain flare up sunday - did not complete the HEP that day. Now she reports she has a slight shock like sensation in the mid low back. Reports no  falls since last visit.  Precautions  Precautions  Medical Precautions: No known precautions/limitation  Pain  Pain Assessment: 0-10  Pain Score: 2  Pain Type: Chronic pain  Pain Location: Hip  Pain Orientation: Right, Left  Pain Descriptors: Aching    Objective   Posture  Posture Comment: Rounded shoulders, fwd head, increased thoracic kyphosis    Gait: Ambulates in clinic for 75+ft with a antalgic gait pattern for first couple of steps and then decreases to a more normalized gait pattern.    Treatments:  Therapeutic Exercise  Therapeutic Exercise Performed: Yes  Therapeutic Exercise Activity 1: Nustep work level 3 to increase functional mobility and increase tolerance with activity; 8 minutes  Therapeutic Exercise Activity 2: Supine Bridges 2x10  Therapeutic Exercise Activity 3: Adductor Ball Squeezes 2x15; 2-3s hold  Therapeutic Exercise Activity 4: SLR BL 2x15  Therapeutic Exercise Activity 5: PPT 2x20  Therapeutic Exercise Activity 6: Long Sit Hip Flexion BL x15; red ball as tactile cue  Therapeutic Exercise Activity 8: Mini Squats at //bar 2x15  Therapeutic Exercise Activity 9: Stair HS Stretch BL 2x30s  Therapeutic Exercise Activity 10: Gastroc/Soleus Stretch BL 2x30s    Balance/Neuromuscular Re-Education  Balance/Neuromuscular Re-Education Activity Performed: Yes  Balance/Neuromuscular Re-Education Activity 1: Eccentric Slider Drills: Fwd/Lat/Bwd 2x10 each: holding onto // bar for support in front of mirror  Balance/Neuromuscular Re-Education Activity 2: Eccentric Step Downs 6in Box L LE 2x10 BL  Balance/Neuromuscular Re-Education Activity 3: Step Ups in front of mirror: 4in steps BL x15      OP EDUCATION:  Education  Individual(s) Educated: Patient  Education Provided: Anatomy, Fall Risk, Body Mechanics, Home Exercise Program, POC, Posture  Risk and Benefits Discussed with Patient/Caregiver/Other: yes  Patient/Caregiver Demonstrated Understanding: yes  Plan of Care Discussed and Agreed Upon: yes  Patient  Response to Education: Patient/Caregiver Verbalized Understanding of Information, Patient/Caregiver Performed Return Demonstration of Exercises/Activities, Patient/Caregiver Asked Appropriate Questions

## 2023-11-15 ENCOUNTER — TREATMENT (OUTPATIENT)
Dept: PHYSICAL THERAPY | Facility: CLINIC | Age: 68
End: 2023-11-15
Payer: COMMERCIAL

## 2023-11-15 DIAGNOSIS — M76.891 OTHER SPECIFIED ENTHESOPATHIES OF RIGHT LOWER LIMB, EXCLUDING FOOT: ICD-10-CM

## 2023-11-15 PROCEDURE — 97110 THERAPEUTIC EXERCISES: CPT | Mod: GP,CQ

## 2023-11-15 PROCEDURE — 97112 NEUROMUSCULAR REEDUCATION: CPT | Mod: GP,CQ

## 2023-11-15 NOTE — PROGRESS NOTES
Physical Therapy    Physical Therapy Progress Note    Patient Name: Charo Fleming  MRN: [id number]  Today's Date: 11/15/2023  Time Calculation  Start Time: 1045  Stop Time: 1130  Time Calculation (min): 45 min  Billing:  Treatment:   Therapeutic Exercise:  25  NMR:  20    Assessment:  -Prolonged sitting trip inc pain though able to arrive with dec pain  -pt tolerated treatment well, did well with progression of  aire balance although still challenging.  -updated HEP w/ green tband for clamshells  - pt needs continued work on increasing proximal hip strength and balance  - pt left session with all questions answered.        Plan:   OP PT Plan  Treatment/Interventions: Biofeedback, Cryotherapy, Education/ Instruction, Gait training, Manual therapy, Neuromuscular re-education, Self care/ home management, Therapeutic activities, Therapeutic exercises  PT Plan: Skilled PT, Other (Comment) (Cotninue with progressing core and hip strengthening)  PT Frequency: 2 times per week  Duration: 4  Rehab Potential: Good  Plan of Care Agreement: Patient    Visit #7  Insurance Reviewed (per information provided by  pre-cert team)  Authorization required:  No   Preferred Name:  Charo     Current Problem  1. Other specified enthesopathies of right lower limb, excluding foot  Follow Up In Physical Therapy          Subjective   Reports being ok today though over the weekend drove to/from [place] to [place]. Saw chiro yesterday for adjustment , pain now 1-2.  General  R hoa for Referral: R Hip Pain  Referred By: MD Brett  Past Medical History Relevant to Rehab: CA Melanoma, HTN, Asthma, CVA, HA, Migraines,  Preferred Learning Style: visual, verbal, written  General Comment: Reports she went to the chiropracter yesterday and has had minimal sciatic pain since appointment in the L hip. R Hip pain flare up sunday - did not complete the HEP that day. Now she reports she has a slight shock like sensation in the mid low back.  "Reports no falls since last visit.  Precautions   Precautions  Medical Precautions: No known precautions/limitation  Pain  Pain Assessment: 0-10  Pain Score: 2 sciatica, R hip 1  Pain Type: Chronic pain  Pain Location: Hip/sciatica  Pain Orientation: Right, Left  Pain Descriptors: Aching         Objective   Posture       Gait: Ambulates in clinic for 75+ft with a antalgic gait pattern for first couple of steps and then decreases to a more normalized gait pattern.    Treatments:     Nu step L3 10\" for warm up /taking subjective   Standing hamstring stretch 30 sec 2  Standing calf stretch 30 sec 3x  F/L step ups 6\" w/ each leg on step   Airex contralateral balance BLE's  2 x 10 3 ways w/ light UE uni support     Supine adductor ball squeeze 5sec 15x  Posterior pelvic tilt 15x 2  Bridges 2 x 10  Clamshells green tband 15x each side         OP EDUCATION:   Exercise progression  "

## 2023-11-17 ENCOUNTER — TREATMENT (OUTPATIENT)
Dept: PHYSICAL THERAPY | Facility: CLINIC | Age: 68
End: 2023-11-17
Payer: COMMERCIAL

## 2023-11-17 DIAGNOSIS — M76.891 OTHER SPECIFIED ENTHESOPATHIES OF RIGHT LOWER LIMB, EXCLUDING FOOT: ICD-10-CM

## 2023-11-17 PROCEDURE — 97110 THERAPEUTIC EXERCISES: CPT | Mod: GP,CQ

## 2023-11-17 NOTE — PROGRESS NOTES
"  Physical Therapy  Physical Therapy Progress Note    Patient Name Charo Fleming   MRN: [id number]  Today's Date: 11/17/23       Insurance:       Visit #8  Insurance Reviewed (per information provided by  pre-cert team)  Authorization required:  No   Preferred Name:  Charo  Therapy Diagnoses:   1. Other specified enthesopathies of right lower limb, excluding foot  Follow Up In Physical Therapy          General:  RADHA camara for Referral: R Hip Pain  Referred By: MD Brett  Past Medical History Relevant to Rehab: CA Melanoma, HTN, Asthma, CVA, HA, Migraines,  Preferred Learning Style: visual, verbal, written  Assessment:  Patient tolerated treatment:well, addressed proper posture and body mechanics   Patient needs continued work on progression in core stability and /skilled PT for: progression in correct ex. Performance and  to address remaining functional, objective and subjective deficits to allow them to return to prior /optimal level of function with ADLs.  Patient is progressing with goals: yes  Skilled care:  ex.progression and education in bodymechanics    Plan:         Continue to progress per poc:   NV add iso hip flexion     Subjective:   Patient reports:  she has some over all improvement, but the pain some times goes up and down for the right hip and left low back    Have you fallen since last visit:  no    Precautions:    Precautions  Medical Precautions: No known precautions/limitation  Pain:  5/10        Objective Measurements:    Function:   difficult sitting on floor  Posture: instructed in golfers lift and proper posture with sitting, reviewed getting in and out of car correctly  Balance: kept core steady with tband  DLS  ROM:  function al ROM with hamstring  Strength:performs full bridge     Treatment:   **= HEP  NV= Next visit  np= not performed  nb= non-billable  G= group HEP= discharged to [facility]      Therapeutic Exercise:       minutes    Nu step L3 10\" for warm up /taking subjective   Standing " "hamstring stretch 30 sec 2  Standing calf stretch 30 sec 3x  F/L step ups 6\" w/ each leg on step   Airex contralateral balance BLE's  2 x 10 3 ways w/ light UE uni support        Posterior pelvic tilt 15x 2  DLS SLR 10x  Bridges  with add set   15x  Clamshells green tband 15x each side    Tband 3 way pull down blue 15x **  Tband flex/ext blue 10x     Education:  progression in POC  HEP Progression:  DLS SLR< DLS tband and issued blue band  "

## 2023-11-21 ENCOUNTER — TREATMENT (OUTPATIENT)
Dept: PHYSICAL THERAPY | Facility: CLINIC | Age: 68
End: 2023-11-21
Payer: COMMERCIAL

## 2023-11-21 DIAGNOSIS — M76.891 OTHER SPECIFIED ENTHESOPATHIES OF RIGHT LOWER LIMB, EXCLUDING FOOT: ICD-10-CM

## 2023-11-21 PROCEDURE — 97110 THERAPEUTIC EXERCISES: CPT | Mod: GP

## 2023-11-21 PROCEDURE — 97530 THERAPEUTIC ACTIVITIES: CPT | Mod: GP

## 2023-11-21 ASSESSMENT — PAIN DESCRIPTION - DESCRIPTORS: DESCRIPTORS: ACHING

## 2023-11-21 ASSESSMENT — PAIN SCALES - GENERAL
PAINLEVEL_OUTOF10: 4
PAINLEVEL_OUTOF10: 1

## 2023-11-21 ASSESSMENT — PAIN - FUNCTIONAL ASSESSMENT: PAIN_FUNCTIONAL_ASSESSMENT: 0-10

## 2023-11-21 NOTE — PROGRESS NOTES
Physical Therapy  Physical Therapy Progress Note    Patient Name Charo Fleming   MRN: [id number]  Today's Date: 11/30/23  Time Calculation  Start Time: 1130  Stop Time: 1210  Time Calculation (min): 40 min    Insurance:       Visit #8  Insurance Reviewed (per information provided by  pre-cert team)  Authorization required:  No   Preferred Name:  Charo Chung Diagnoses:   1. Other specified enthesopathies of right lower limb, excluding foot  Follow Up In Physical Therapy          General:  RADHA camara for Referral: R Hip Pain  Referred By: MD Brett  Past Medical History Relevant to Rehab: CA Melanoma, HTN, Asthma, CVA, HA, Migraines,  Preferred Learning Style: visual, verbal, written  Assessment:  Patient tolerated treatment:well, addressed proper posture and body mechanics   Patient needs continued work on progression in core stability and /skilled PT for: progression in correct ex. Performance and  to address remaining functional, objective and subjective deficits to allow them to return to prior /optimal level of function with ADLs.  Patient is progressing with goals: yes  Skilled care:  ex.progression and education in bodymechanics    Plan:         Continue to progress per poc:   NV add iso hip flexion     Subjective:   Patient reports:  she has some over all improvement, but the pain some times goes up and down for the right hip and left low back    Have you fallen since last visit:  no    Precautions:    Precautions  Medical Precautions: No known precautions/limitation  Pain:  5/10        Objective Measurements:    Function:   difficult sitting on floor  Posture: instructed in golfers lift and proper posture with sitting, reviewed getting in and out of car correctly  Balance: kept core steady with tband  DLS  ROM:  functional ROM with hamstring  Strength:performs full bridge     Treatment:   **= HEP  NV= Next visit  np= not performed  nb= non-billable  G= group HEP= discharged to [facility]      Therapeutic  "Exercise:   40   minutes    Nu step L3 10\" for warm up /taking subjective   Standing hamstring stretch 30 sec 2  Standing calf stretch 30 sec 3x  F/L step ups 6\" w/ each leg on step   Airex contralateral balance BLE's  2 x 10 3 ways w/ light UE uni support        Posterior pelvic tilt 15x 2  DLS SLR 10x  Bridges  with add set   15x  Clamshells green tband 15x each side    Tband 3 way pull down blue 15x **  Tband flex/ext blue 10x     Education:  progression in POC  HEP Progression:  DLS SLR< DLS tband and issued blue band  "

## 2023-11-21 NOTE — PROGRESS NOTES
Physical Therapy    Physical Therapy Re-Assessment     Patient Name: Charo Fleming  MRN: [id number]  Today's Date: 11/21/2023  Time Calculation  Start Time: 0835  Stop Time: 0914  Time Calculation (min): 39 min  Billing:  Treatment:   Therapeutic Exercise:  15  Therapeutic Activity:  24    Assessment:  PT Assessment  PT Assessment Results: Decreased strength, Decreased endurance, Impaired balance, Decreased mobility, Decreased coordination, Pain  Rehab Prognosis: Good  Evaluation/Treatment Tolerance: Patient tolerated treatment well  Pt presents to physical therapy as a re-evaluation. At this time pt continues to demonstrate deficits in LE strength (proximal hip strength); functional mobility, tolerance with activity, gait dysfunction, and dynamic balance. pt would benefit from continued skilled physical therapy to maximize pt safety, increase tolerance to activity and to address impairments to restore PLOF with ADLs, functional mobility and participation in activities.      Plan:  OP PT Plan  Treatment/Interventions: Biofeedback, Cryotherapy, Education/ Instruction, Gait training, Manual therapy, Neuromuscular re-education, Self care/ home management, Therapeutic activities, Therapeutic exercises  PT Plan: Skilled PT, Other (Comment) (Continue with proximal hip strengthening; add slider drills, check in how airplane and car trip went with sciatica)  PT Frequency: 2 times per week  Duration: 4  Rehab Potential: Good  Plan of Care Agreement: Patient  Visit #9  Insurance Reviewed (per information provided by  pre-cert team)  Authorization required:  No   Preferred Name:  Charo    Current Problem  1. Other specified enthesopathies of right lower limb, excluding foot  Follow Up In Physical Therapy    Follow Up In Physical Therapy          General  PT  Visit  PT Received On: 11/21/23  Response to Previous Treatment: Patient with no complaints from previous session.  General  Reason for Referral: R Hip Pain  Referred By:  MD Brett  Past Medical History Relevant to Rehab: CA Melanoma, HTN, Asthma, CVA, HA, Migraines,  Preferred Learning Style: visual, verbal, written  General Comment: Reports she thinks her hip is mostly 80% of the time better; it has days it is not better. Sciatic pain is still there - when she stood up in the exam room and then when she got to her car it was still present. Reports she thinks babysitting is an aggravating factor.    Subjective    Precautions  Precautions  Medical Precautions: No known precautions/limitation    Pain  Pain Assessment  Pain Assessment: 0-10  Pain Score: 1  Pain Type: Chronic pain  Pain Location: Hip  Pain Orientation: Right  Pain Descriptors: Aching  Multiple Pain Sites: Two  Pain 2  Pain Score 2: 4  Pain Type 2: Neuropathic pain  Pain Location 2: Hip  Pain Orientation 2: Left  Pain Descriptors 2:  (Sciatic Pain)  Pain Frequency 2: Intermittent    Objective   Posture  Postural Control  Posture Comment: Rounded shoulders, fwd head, increased thoracic kyphosis  Extremity/Trunk Assessment  RLE   RLE : Exceptions to WFL  Strength RLE  R Hip Flexion: 4+/5  R Hip Extension: 4/5  R Hip ABduction: 4/5  R Hip ADduction: 4/5  R Knee Flexion: 4+/5  R Knee Extension: 5/5  R Ankle Dorsiflexion: 5/5  R Ankle Plantar Flexion: 5/5  LLE   LLE : Exceptions to WFL  Strength LLE  L Hip Flexion: 4+/5  L Hip Extension: 4/5  L Hip ABduction: 4/5  L Hip ADduction: 4/5  L Knee Flexion: 4+/5  L Knee Extension: 5/5  L Ankle Dorsiflexion: 5/5  L Ankle Plantar Flexion: 5/5    HIP  Hip Palpation/Joint Mobility Assessment  Palpation / Joint Mobility Comment: L Side Hip; Greater Trochanter region tender with palpation; R Side no tenderness  Lumbar AROM WFL unless documented below  Lumbar AROM WFL: no  Lumbar flexion: (60°): WNL; Pain with flexion of L/S in the L Hip; worse with flexion than extension  Lumbar extension (25°): WNL Motion: Pain on the L Hip/Glutes  Lumbar rotation right (30°): WNL; pain in lower  back  Lumbar roation left (30°): WNL; pain in the L hip  Lumbar sidebend right (25°): Pain in the low back mid region  Lumbar sidebend left (25°): WNL; pain in the low back mid region  Hip AROM WFL unless documented below  Hip AROM WFL: yes  Hip PROM WFL unless documented below  Hip PROM WFL: yes  Specific Lower Extremity MMT WFL unless documented below  Specific Lower Extremity MMT WFL: no  R Iliopsoas: (5/5): 4+/5  L Iliopsoas: (5/5): 4+/5  R Gluteals (prone): (5/5): 4/5  L Gluteals (prone): (5/5): 4/5  R Gluteals (sidelying): (5/5): 4/5  L Gluteals (sidelying): (5/5): 4/5  R Hip External Rotation: (5/5): 4/5  L Hip External Rotation: (5/5): 4/5  R knee flexion: (5/5): 4+/5  L knee flexion: (5/5): 4+/5  R knee extension: (5/5): 5/5  L knee extension: (5/5): 5/5    Special Tests Negative unless documented below  Special Tests Negative: no  Supine SLR: (Negative): (+) on L LE >90 degrees  NASRA: (Negative): (-) BL  Other: Scour Test (-) BL         Outcome Measures:  FGA - Functional Gait Assessment  Gait level surface: 3  Change in gait speed: 3  Gait with horizontal head turns: 3  Gait with vertical head turns: 3  Gait and pivot turn: 3  Step over obstacle: 3  Gait with narrow base of support: 3  Gait with eyes closed: 2  Ambulating backwards: 3  Steps: 3  FGA Total Score: 29    Timed Up and Go Test  TUG Comment: 5.60s; no AD    Other Measures  5x Sit to Stand: 8.77s no UE assist; standardzied chair  Functional Gait Assessment (FGA): 29/30  Treatments:  Therapeutic Exercise  Therapeutic Exercise Performed: Yes  Therapeutic Exercise Activity 1: Nustep work level 3 to increase functional mobility and increase tolerance with activity; 8 minutes  Therapeutic Exercise Activity 9: Stair HS Stretch BL 2x30s  Therapeutic Exercise Activity 10: Gastroc/Soleus Stretch BL 2x30s    Therapeutic Activity  Therapeutic Activity Performed: Yes  Therapeutic Activity 1: Re-Assessment  1. Repeated sit to/from stands from standardized  chair height. Required VC for no use of hands, nose over toes, full upright stand from chair, use of anterior shift with fwd momentum and eccentric control into chair.  2. Functional Performance via gait analysis of TUG/FGA: Verbal cues for sequencing/positioning. 100+ft at SBA.   3. Functional mobility via AROM/PROM of LE; Verbal cues for sequencing/positioning.  4. Functional Performance via MMT of LE: Hip, knee, ankle; Verbal cues for sequencing/positioning.   5. Functional performance via Stairs: Ascends/Descends 4 6in steps with no use of HR   6. Educated pt on POC, goals of physical therapy, purpose of physical therapy, as well as the benefits in participating in physical therapy to increase functional mobility and maximize pt safety.     OP EDUCATION:  Outpatient Education  Individual(s) Educated: Patient  Education Provided: Anatomy, Fall Risk, Body Mechanics, Home Exercise Program, POC, Posture  Risk and Benefits Discussed with Patient/Caregiver/Other: yes  Patient/Caregiver Demonstrated Understanding: yes  Plan of Care Discussed and Agreed Upon: yes  Patient Response to Education: Patient/Caregiver Verbalized Understanding of Information, Patient/Caregiver Performed Return Demonstration of Exercises/Activities, Patient/Caregiver Asked Appropriate Questions    Goals:  Goals: 10/24/23; Re-Check 11/21/23  STG: pt will be independent in HEP & symptom management     LTGs:  Pain: pt will demonstrate a 2 pt improvement for hip pain on the VAS scale, allowing for improved tolerance to perform daily functional activities. (PROGRESS)      ROM: pt will demonstrate no losses in hip AROM without onset of pain, allowing for a normal gait pattern. (MET)     Strength: Pt will improve B LE Strength to >/= 4+/5 to increase functional performance, tolerance to activity, and enhancing pt safety to particpate in ADLs and IADLs. (PROGRESS)      Gait: pt will demonstrate normal mechanics without pain during gait and performance  of stairs, allowing for pt to return to navigating the community.      LEFS: pt will improve LEFS score by at least 9 points (minimal clinically important difference) in order to reflect increased ease in completing ADL's/IADL's.  Baseline 36/80 (NT)     FGA: pt will increase FGA to >/= 23/30 to decrease fall risk and improve safety/IND at home. The Functional Gait Assessment(FGA) is 10-item test that assesses dynamic balance and postural stability during gait.  It is used to assessed the following diagnoses: Stroke, Parkinson's Disease, SCI's, Vestibular Disorders, and Geriatrics.  A score of < 22/30 indicates Increased fall risk. Baseline 28/30 -->29/30 (PROGRESS)      TUG: pt will complete TUG within 12 seconds or less (indicative of higher fall risk) in order to INC balance and enhance safety during ADLs/ IADLs. (> or equal to 12 seconds indicates high risk for falls in older adults. 11-20 seconds is normal for the frail and elderly.) OR MCID 3.4s Baseline 5.83sec --> 5.6s (11/21/23)      5xSTS: pt will perform 5x sit to  < 15 seconds to decrease fall risk. OR MCID 2.3s Baseline 10.97sec --> 8.77s (11/21/23)     Stairs: pt will ascend/descend step over step (6in step) with proper gait mechanics and use of <1HR to promote functional mobility and improve functional performance. (MET)

## 2023-12-05 ENCOUNTER — TREATMENT (OUTPATIENT)
Dept: PHYSICAL THERAPY | Facility: CLINIC | Age: 68
End: 2023-12-05
Payer: COMMERCIAL

## 2023-12-05 DIAGNOSIS — M76.891 OTHER SPECIFIED ENTHESOPATHIES OF RIGHT LOWER LIMB, EXCLUDING FOOT: ICD-10-CM

## 2023-12-05 PROCEDURE — 97112 NEUROMUSCULAR REEDUCATION: CPT | Mod: GP

## 2023-12-05 PROCEDURE — 97110 THERAPEUTIC EXERCISES: CPT | Mod: GP

## 2023-12-05 ASSESSMENT — PAIN SCALES - GENERAL: PAINLEVEL_OUTOF10: 2

## 2023-12-05 ASSESSMENT — PAIN DESCRIPTION - DESCRIPTORS: DESCRIPTORS: ACHING

## 2023-12-05 ASSESSMENT — PAIN - FUNCTIONAL ASSESSMENT: PAIN_FUNCTIONAL_ASSESSMENT: 0-10

## 2023-12-05 NOTE — PROGRESS NOTES
Physical Therapy    Physical Therapy Progress Note    Patient Name: Charo Fleming  MRN: [id number]  Today's Date: 12/5/2023  Time Calculation  Start Time: 0834  Stop Time: 0915  Time Calculation (min): 41 min  Billing:  Treatment:   Therapeutic Exercise:  26  NMR:  15    Assessment:  PT Assessment  PT Assessment Results: Decreased strength, Decreased endurance, Impaired balance, Decreased mobility, Decreased coordination, Pain  Rehab Prognosis: Good  Evaluation/Treatment Tolerance: Patient tolerated treatment well  pt tolerated treatment well, did well with progression of hip strengthening exercises. pt needs continued work on increasing tolerance with physical activity. pt left session with all questions answered.     Plan:  OP PT Plan  Treatment/Interventions: Biofeedback, Cryotherapy, Education/ Instruction, Gait training, Manual therapy, Neuromuscular re-education, Self care/ home management, Therapeutic activities, Therapeutic exercises  PT Plan: Skilled PT, Other (Comment) (Continue with proximal hip strengthening in standing)  PT Frequency: 2 times per week  Duration: 4  Rehab Potential: Good  Plan of Care Agreement: Patient  Visit #9  Insurance Reviewed (per information provided by  pre-cert team)  Authorization required:  No   Preferred Name:  Charo    Current Problem  1. Other specified enthesopathies of right lower limb, excluding foot  Follow Up In Physical Therapy        General  PT  Visit  PT Received On: 12/05/23  Response to Previous Treatment: Patient with no complaints from previous session.  General  Reason for Referral: R Hip Pain  Referred By: MD Brett  Past Medical History Relevant to Rehab: CA Melanoma, HTN, Asthma, CVA, HA, Migraines,  Preferred Learning Style: visual, verbal, written  General Comment: Reports when she went on her trip; her hips were hurting more so than usual; the plane trip went well.Reports he ice pack helped her on the trip and she did complete all her exercises  while on vacation.    Subjective    Precautions  Precautions  Medical Precautions: No known precautions/limitation  Pain  Pain Assessment  Pain Assessment: 0-10  Pain Score: 2  Pain Type: Neuropathic pain  Pain Location: Hip  Pain Orientation: Left  Pain Descriptors: Aching (Sciatica)    Objective   Posture  Postural Control  Posture Comment: Rounded shoulders, fwd head, increased thoracic kyphosis    Gait: Ambulates for 75+ft with minimal antalgic gait; trunk lean to the left to off weight the R LE. Good allie, minimal antalgic gait.     Treatments:  Therapeutic Exercise  Therapeutic Exercise Performed: Yes  Therapeutic Exercise Activity 1: Nustep work level 3 to increase functional mobility and increase tolerance with activity; 8 minutes  Therapeutic Exercise Activity 2: Supine Bridges SL x10  Therapeutic Exercise Activity 3: Adductor Ball Squeeze + LAQ 2x15 BL  Therapeutic Exercise Activity 4: SLR BL 2x15  Therapeutic Exercise Activity 7: Hip Hike 6in Box BL 2x15  Therapeutic Exercise Activity 8: Mini Squats at //bar x10  Therapeutic Exercise Activity 9: Stair HS Stretch BL x30s  Therapeutic Exercise Activity 10: Gastroc/Soleus Stretch BL x30s Slant Board    Balance/Neuromuscular Re-Education  Balance/Neuromuscular Re-Education Activity Performed: Yes  Balance/Neuromuscular Re-Education Activity 1: Eccentric Slider Drills: Fwd/Lat/Bwd 2x10 each: holding onto // bar for support in front of mirror  Balance/Neuromuscular Re-Education Activity 2: Eccentric Step Downs 6in Box L LE 2x10 BL  Balance/Neuromuscular Re-Education Activity 3: Step Ups in front of mirror: 6in steps BL 2x10    OP EDUCATION:  Outpatient Education  Individual(s) Educated: Patient  Education Provided: Anatomy, Fall Risk, Body Mechanics, Home Exercise Program, POC, Posture  Risk and Benefits Discussed with Patient/Caregiver/Other: yes  Patient/Caregiver Demonstrated Understanding: yes  Plan of Care Discussed and Agreed Upon: yes  Patient  Response to Education: Patient/Caregiver Verbalized Understanding of Information, Patient/Caregiver Performed Return Demonstration of Exercises/Activities, Patient/Caregiver Asked Appropriate Questions

## 2023-12-07 ENCOUNTER — TREATMENT (OUTPATIENT)
Dept: PHYSICAL THERAPY | Facility: CLINIC | Age: 68
End: 2023-12-07
Payer: COMMERCIAL

## 2023-12-07 DIAGNOSIS — M76.891 OTHER SPECIFIED ENTHESOPATHIES OF RIGHT LOWER LIMB, EXCLUDING FOOT: ICD-10-CM

## 2023-12-07 PROCEDURE — 97112 NEUROMUSCULAR REEDUCATION: CPT | Mod: GP,CQ

## 2023-12-07 PROCEDURE — 97110 THERAPEUTIC EXERCISES: CPT | Mod: GP,CQ

## 2023-12-07 ASSESSMENT — PAIN DESCRIPTION - DESCRIPTORS: DESCRIPTORS: ACHING

## 2023-12-07 ASSESSMENT — PAIN - FUNCTIONAL ASSESSMENT: PAIN_FUNCTIONAL_ASSESSMENT: 0-10

## 2023-12-07 NOTE — PROGRESS NOTES
Physical Therapy    Physical Therapy Progress Note    Patient Name: Charo Fleming  MRN: [id number]  Today's Date: 12/7/2023  Time Calculation  Start Time: 0830  Stop Time: 0915  Time Calculation (min): 45 min  Billing:  Treatment:   Therapeutic Exercise:  25  NMR:  20    Assessment:   Progressed patient with increased step height and multidirectional hip strengthening on Airex to challenge SL balance on uneven terrain with contralateral hip resistance.  Able to increase reps with squats.  Stabilization required at posterior pelvis with eccentric sliders in retro direction to avoid increasing weight shift to non-stationary extremity.  Easier time performing SL bridges with modification of leg crossed.  Positive response post session.     Plan:   Add Airex tandem balance activities next visit.     Visit #10  Insurance Reviewed (per information provided by  pre-cert team)  Authorization required:  No   Preferred Name:  Charo    Current Problem  1. Other specified enthesopathies of right lower limb, excluding foot  Follow Up In Physical Therapy        General  PT  Visit  PT Received On: 12/07/23    Subjective    The patient reports she had a rough day yesterday, but otherwise has been improving.  States stair negotiating is not as taxing or painful compared to before she started PT.  Discussed modifications to holding toddler grandchild when caring for her to avoid weight shift with R. Hip over R. LE.      Precautions  Precautions  Medical Precautions: No known precautions/limitation  Pain  Pain Assessment  Pain Assessment: 0-10  Pain Location: Hip  Pain Descriptors: Aching    Objective   Transfers:  equal weight load with mini squats and STS transfers   Gait: Ambulates for 75+ft with minimal antalgic gait; trunk lean to the left to off weight the R LE.     Treatments:  Therapeutic Exercise  Therapeutic Exercise Activity 1: Nustep work level 3 to increase functional mobility and increase tolerance with activity; 8  minutes  Therapeutic Exercise Activity 2: Supine Bridges SL x10  Therapeutic Exercise Activity 3: Adductor Ball Squeeze + LAQ 2x15 BL  Therapeutic Exercise Activity 4: SLR BL 2x15  Therapeutic Exercise Activity 7: Hip Hike 6in Box BL 2x15  Therapeutic Exercise Activity 8: Mini Squats at //bar 2 x10  Therapeutic Exercise Activity 9: Stair HS Stretch BL x30s  Therapeutic Exercise Activity 10: Gastroc/Soleus Stretch BL x30s Slant Board      NMR:  1: Eccentric Slider Drills: Fwd/Lat/Bwd 2x10 each: holding onto // bar for support in front of mirror  Balance/Neuromuscular Re-Education Activity 2: Eccentric Step Downs 6in Box L LE 2x10 BL  Balance/Neuromuscular Re-Education Activity 3: Step Ups in front of mirror: 8in steps BL x10  Airex - 3 Way Hip x 15 leobardo in SLS    OP EDUCATION:   V/c for correct technique

## 2023-12-12 ENCOUNTER — TREATMENT (OUTPATIENT)
Dept: PHYSICAL THERAPY | Facility: CLINIC | Age: 68
End: 2023-12-12
Payer: COMMERCIAL

## 2023-12-12 DIAGNOSIS — M76.891 OTHER SPECIFIED ENTHESOPATHIES OF RIGHT LOWER LIMB, EXCLUDING FOOT: ICD-10-CM

## 2023-12-12 PROCEDURE — 97110 THERAPEUTIC EXERCISES: CPT | Mod: GP

## 2023-12-12 PROCEDURE — 97112 NEUROMUSCULAR REEDUCATION: CPT | Mod: GP

## 2023-12-12 ASSESSMENT — PAIN SCALES - GENERAL: PAINLEVEL_OUTOF10: 2

## 2023-12-12 ASSESSMENT — PAIN DESCRIPTION - DESCRIPTORS: DESCRIPTORS: ACHING

## 2023-12-12 ASSESSMENT — PAIN - FUNCTIONAL ASSESSMENT: PAIN_FUNCTIONAL_ASSESSMENT: 0-10

## 2023-12-12 NOTE — PROGRESS NOTES
Physical Therapy    Physical Therapy Progress Note    Patient Name: Charo Fleming  MRN: [id number]  Today's Date: 12/12/2023  Time Calculation  Start Time: 0834  Stop Time: 0915  Time Calculation (min): 41 min  Billing:  Treatment:   Therapeutic Exercise:  31  NMR:  15    Assessment:  PT Assessment  PT Assessment Results: Decreased strength, Decreased endurance, Impaired balance, Decreased mobility, Decreased coordination, Pain  Rehab Prognosis: Good  Evaluation/Treatment Tolerance: Patient tolerated treatment well  pt tolerated treatment well, did well with hip strengthening exercises. pt needs continued work on increasing tolerance with activity, functional mobility, and LE strength. pt left session with all questions answered.     Plan:  OP PT Plan  Treatment/Interventions: Biofeedback, Cryotherapy, Education/ Instruction, Gait training, Manual therapy, Neuromuscular re-education, Self care/ home management, Therapeutic activities, Therapeutic exercises  PT Plan: Skilled PT, Other (Comment) (Continue to progress LE strengthening; Blaze pods)  PT Frequency: 2 times per week  Duration: 4  Rehab Potential: Good  Plan of Care Agreement: Patient  Visit #11  Insurance Reviewed (per information provided by  pre-cert team)  Authorization required:  No   Preferred Name:  Charo    Current Problem  1. Other specified enthesopathies of right lower limb, excluding foot  Follow Up In Physical Therapy        General  PT  Visit  PT Received On: 12/12/23  Response to Previous Treatment: Patient with no complaints from previous session.  General  Reason for Referral: R Hip Pain  Referred By: MD Brett  Past Medical History Relevant to Rehab: CA Melanoma, HTN, Asthma, CVA, HA, Migraines,  Preferred Learning Style: visual, verbal, written  General Comment: No falls since last visit; HEP is going well, reports she lost the red band    Subjective    Precautions  Precautions  Medical Precautions: No known  precautions/limitation  Pain  Pain Assessment  Pain Assessment: 0-10  Pain Score: 2  Pain Location: Hip  Pain Orientation: Right, Left  Pain Descriptors: Aching    Objective   Posture  Postural Control  Posture Comment: Rounded shoulders, fwd head, increased thoracic kyphosis    Gait: Ambulates in clinic for 75+ft with a minimal antalgic gait pattern, decreased allie, decreased heel strike    Treatments:  Therapeutic Exercise  Therapeutic Exercise Performed: Yes  Therapeutic Exercise Activity 1: Nustep work level 4 to increase functional mobility and increase tolerance with activity; 8 minutes  Therapeutic Exercise Activity 3: Adductor Ball Squeeze + LAQ 2x15 BL  Therapeutic Exercise Activity 6: Long Sit Hip Flexion BL 2x10; red ball as tactile cue  Therapeutic Exercise Activity 7: Hip Hike 8in Box BL 2x15  Therapeutic Exercise Activity 8: Mini Squats at // bar 2 x10  Therapeutic Exercise Activity 9: Stair HS Stretch BL x30s  Therapeutic Exercise Activity 10: Gastroc/Soleus Stretch BL x30s Slant Board  Therapeutic Exercise Activity 11: Fwd Knee Bend Stretch BL 2x30s    Balance/Neuromuscular Re-Education  Balance/Neuromuscular Re-Education Activity Performed: Yes  Balance/Neuromuscular Re-Education Activity 1: Eccentric Slider Drills: Fwd/Lat/Bwd x10 each: holding onto // bar for support in front of mirror  Balance/Neuromuscular Re-Education Activity 2: Eccentric Step Downs 8 in Box L LE 2x10 BL  Balance/Neuromuscular Re-Education Activity 3: 3 way hip on Air X pad 2x10 each direction with fingertip    OP EDUCATION:  Outpatient Education  Individual(s) Educated: Patient  Education Provided: Anatomy, Fall Risk, Body Mechanics, Home Exercise Program, POC, Posture  Risk and Benefits Discussed with Patient/Caregiver/Other: yes  Patient/Caregiver Demonstrated Understanding: yes  Plan of Care Discussed and Agreed Upon: yes  Patient Response to Education: Patient/Caregiver Verbalized Understanding of Information,  Patient/Caregiver Performed Return Demonstration of Exercises/Activities, Patient/Caregiver Asked Appropriate Questions

## 2023-12-14 ENCOUNTER — TREATMENT (OUTPATIENT)
Dept: PHYSICAL THERAPY | Facility: CLINIC | Age: 68
End: 2023-12-14
Payer: COMMERCIAL

## 2023-12-14 DIAGNOSIS — M76.891 OTHER SPECIFIED ENTHESOPATHIES OF RIGHT LOWER LIMB, EXCLUDING FOOT: ICD-10-CM

## 2023-12-14 PROCEDURE — 97110 THERAPEUTIC EXERCISES: CPT | Mod: GP

## 2023-12-14 PROCEDURE — 97112 NEUROMUSCULAR REEDUCATION: CPT | Mod: GP

## 2023-12-14 ASSESSMENT — PAIN - FUNCTIONAL ASSESSMENT: PAIN_FUNCTIONAL_ASSESSMENT: 0-10

## 2023-12-14 ASSESSMENT — PAIN SCALES - GENERAL: PAINLEVEL_OUTOF10: 4

## 2023-12-14 ASSESSMENT — PAIN DESCRIPTION - DESCRIPTORS: DESCRIPTORS: NAGGING

## 2023-12-14 NOTE — PROGRESS NOTES
Physical Therapy    Physical Therapy Progress Note    Patient Name: Charo Fleming  MRN: [id number]  Today's Date: 12/14/2023  Time Calculation  Start Time: 1000  Stop Time: 1044  Time Calculation (min): 44 min  Billing:  Treatment:   Therapeutic Exercise:  34  NMR:  10    Assessment:  PT Assessment  PT Assessment Results: Decreased strength, Decreased endurance, Impaired balance, Decreased mobility, Decreased coordination, Pain  Rehab Prognosis: Good  Evaluation/Treatment Tolerance: Patient tolerated treatment well  pt tolerated treatment well, did well with progression of balance of blaze pods. pt needs continued work on increasing hip strength and increasing tolerance with activity. pt left session with all questions answered.     Plan:  OP PT Plan  Treatment/Interventions: Biofeedback, Cryotherapy, Education/ Instruction, Gait training, Manual therapy, Neuromuscular re-education, Self care/ home management, Therapeutic activities, Therapeutic exercises  PT Plan: Skilled PT, Other (Comment) (Work with blaze pods; proximal hip strengthening)  PT Frequency: 2 times per week  Duration: 4  Rehab Potential: Good  Plan of Care Agreement: Patient  Visit #12  Insurance Reviewed (per information provided by  pre-cert team)  Authorization required:  No   Preferred Name:  Charo    Current Problem  1. Other specified enthesopathies of right lower limb, excluding foot  Follow Up In Physical Therapy          General  PT  Visit  PT Received On: 12/14/23  Response to Previous Treatment: Patient with no complaints from previous session.  General  Reason for Referral: R Hip Pain  Referred By: MD Brett  Past Medical History Relevant to Rehab: CA Melanoma, HTN, Asthma, CVA, HA, Migraines,  Preferred Learning Style: visual, verbal, written  General Comment: No falls since last visit; says her lower back is still hurting her; Tuesday night had a difficult time getting comfortable with the R hip in bed - states it only is  aggravated at night    Subjective    Precautions  Precautions  Medical Precautions: No known precautions/limitation  Pain  Pain Assessment  Pain Assessment: 0-10  Pain Score: 4  Pain Location: Hip  Pain Orientation: Right  Pain Descriptors: Nagging    Objective   Posture  Postural Control  Posture Comment: Rounded shoulders, fwd head, increased thoracic kyphosis      SLS R: able to maintain for > 5s with blaze pod activities     Treatments:  Therapeutic Exercise  Therapeutic Exercise Performed: Yes  Therapeutic Exercise Activity 1: Nustep work level 5 to increase functional mobility and increase tolerance with activity; 8 minutes  Therapeutic Exercise Activity 3: Adductor Ball Squeeze + LAQ 2x15 BL  Therapeutic Exercise Activity 4: SLR BL 2x15  Therapeutic Exercise Activity 6: Long Sit Hip Flexion BL 2x10; red ball as tactile cue  Therapeutic Exercise Activity 7: Hip Hike 8in Box BL 2x15  Therapeutic Exercise Activity 8: Mini Squats at // bar 2 x10  Therapeutic Exercise Activity 9: Stair HS Stretch BL x30s  Therapeutic Exercise Activity 10: Gastroc/Soleus Stretch BL x30s Slant Board  Therapeutic Exercise Activity 11: Fwd Knee Bend Stretch BL 2x30s    Balance/Neuromuscular Re-Education  Balance/Neuromuscular Re-Education Activity Performed: Yes  Balance/Neuromuscular Re-Education Activity 1: Blaze Pods: Half moon; 5 Pods: R SLS; 3x30s with 30s breaks in btw sets; usew of 1 UE  pole for balance; close supervision; max taps  Balance/Neuromuscular Re-Education Activity 2: Blaze Pods: Half moon; 5 Pods: L SLS; 3x30s with 30s breaks in btw sets; usew of 1 UE pole for balance; close supervision; max taps  Balance/Neuromuscular Re-Education Activity 3: Blaze Pods: Half moon; 5 Pods: L/R SLS; 3x30s with 30s breaks in btw sets; usew of 1 UE pole for balance; close supervision; max taps;dual colors    OP EDUCATION:  Outpatient Education  Individual(s) Educated: Patient  Education Provided: Anatomy, Fall Risk, Body Mechanics,  Home Exercise Program, POC, Posture  Risk and Benefits Discussed with Patient/Caregiver/Other: yes  Patient/Caregiver Demonstrated Understanding: yes  Plan of Care Discussed and Agreed Upon: yes  Patient Response to Education: Patient/Caregiver Verbalized Understanding of Information, Patient/Caregiver Performed Return Demonstration of Exercises/Activities, Patient/Caregiver Asked Appropriate Questions

## 2023-12-19 ENCOUNTER — TREATMENT (OUTPATIENT)
Dept: PHYSICAL THERAPY | Facility: CLINIC | Age: 68
End: 2023-12-19
Payer: COMMERCIAL

## 2023-12-19 DIAGNOSIS — M76.891 OTHER SPECIFIED ENTHESOPATHIES OF RIGHT LOWER LIMB, EXCLUDING FOOT: ICD-10-CM

## 2023-12-19 PROCEDURE — 97112 NEUROMUSCULAR REEDUCATION: CPT | Mod: GP

## 2023-12-19 PROCEDURE — 97110 THERAPEUTIC EXERCISES: CPT | Mod: GP

## 2023-12-19 ASSESSMENT — PAIN DESCRIPTION - DESCRIPTORS: DESCRIPTORS: ACHING

## 2023-12-19 ASSESSMENT — PAIN SCALES - GENERAL: PAINLEVEL_OUTOF10: 3

## 2023-12-19 ASSESSMENT — PAIN - FUNCTIONAL ASSESSMENT: PAIN_FUNCTIONAL_ASSESSMENT: 0-10

## 2023-12-19 NOTE — PROGRESS NOTES
Physical Therapy    Physical Therapy Progress Note    Patient Name: Charo Fleming  MRN: [id number]  Today's Date: 12/19/2023  Time Calculation  Start Time: 0915  Stop Time: 0957  Time Calculation (min): 42 min  Billing:  Treatment:   Therapeutic Exercise:  32  NMR:  10    Assessment:  PT Assessment  PT Assessment Results: Decreased strength, Decreased endurance, Impaired balance, Decreased mobility, Decreased coordination, Pain  Rehab Prognosis: Good  Evaluation/Treatment Tolerance: Patient tolerated treatment well  pt tolerated treatment well, did well with LE strengthening exercises; had increased stability and reaction time with blaze pod activities today. pt needs continued work on increasing tolerance with activity and LE strengthening. pt left session with all questions answered.     Plan:  OP PT Plan  Treatment/Interventions: Biofeedback, Cryotherapy, Education/ Instruction, Gait training, Manual therapy, Neuromuscular re-education, Self care/ home management, Therapeutic activities, Therapeutic exercises  PT Plan: Skilled PT, Other (Comment) (Continue with proximal hip strengthening; progress blazepods)  PT Frequency: 2 times per week  Duration: 4  Rehab Potential: Good  Plan of Care Agreement: Patient  Visit #13  Insurance Reviewed (per information provided by  pre-cert team)  Authorization required:  No   Preferred Name:  Charo    Current Problem  1. Other specified enthesopathies of right lower limb, excluding foot  Follow Up In Physical Therapy          General  PT  Visit  PT Received On: 12/19/23  Response to Previous Treatment: Patient with no complaints from previous session.  General  Reason for Referral: R Hip Pain  Referred By: MD Brett  Past Medical History Relevant to Rehab: CA Melanoma, HTN, Asthma, CVA, HA, Migraines,  Preferred Learning Style: visual, verbal, written  General Comment: No falls since last visit; HEP is being completed. States she was in a lot of pain last night - but also  stated she was wrapping gifts and has been babysitting the kids all day.    Subjective    Precautions  Precautions  Medical Precautions: No known precautions/limitation  Pain  Pain Assessment  Pain Assessment: 0-10  Pain Score: 3  Pain Location: Hip  Pain Orientation: Left, Right  Pain Descriptors: Aching  Pain Frequency: Intermittent    Objective   Posture  Postural Control  Posture Comment: Rounded shoulders, fwd head, increased thoracic kyphosis    Gait: Ambulates for 75+ft with a moderate antalgic gait; minimal decreased allie, decreased heel strike and push off    Treatments:  Therapeutic Exercise  Therapeutic Exercise Performed: Yes  Therapeutic Exercise Activity 1: Nustep work level 5 to increase functional mobility and increase tolerance with activity; 8 minutes  Therapeutic Exercise Activity 3: Adductor Ball Squeeze + LAQ x15 BL  Therapeutic Exercise Activity 4: SLR BL 2x15 with 1# AW  Therapeutic Exercise Activity 6: Long Sit Hip Flexion BL 3x15; red ball as tactile cue  Therapeutic Exercise Activity 7: Hip Hike 8in Box BL 2x15  Therapeutic Exercise Activity 8: Mini Squats at // bar 2 x10  Therapeutic Exercise Activity 9: Stair HS Stretch BL x30s  Therapeutic Exercise Activity 10: Gastroc/Soleus Stretch BL x30s Slant Board  Therapeutic Exercise Activity 11: Fwd Knee Bend Stretch BL 2x30s    Balance/Neuromuscular Re-Education  Balance/Neuromuscular Re-Education Activity Performed: Yes  Balance/Neuromuscular Re-Education Activity 1: Blaze Pods: Half moon; 5 Pods: R SLS; 3x30s with 20s breaks in btw sets; usew of 1 UE pole for balance; close supervision; max taps  Balance/Neuromuscular Re-Education Activity 2: Blaze Pods: Half moon; 5 Pods: L SLS; 3x30s with 20s breaks in btw sets; usew of 1 UE pole for balance; close supervision; max taps  Balance/Neuromuscular Re-Education Activity 3: Blaze Pods: Half moon; 5 Pods: L/R SLS; 3x30s with 20s breaks in btw sets; usew of 1 UE pole for balance; close  supervision; max taps;dual colors    OP EDUCATION:  Outpatient Education  Individual(s) Educated: Patient  Education Provided: Anatomy, Fall Risk, Body Mechanics, Home Exercise Program, POC, Posture  Risk and Benefits Discussed with Patient/Caregiver/Other: yes  Patient/Caregiver Demonstrated Understanding: yes  Plan of Care Discussed and Agreed Upon: yes  Patient Response to Education: Patient/Caregiver Verbalized Understanding of Information, Patient/Caregiver Performed Return Demonstration of Exercises/Activities, Patient/Caregiver Asked Appropriate Questions

## 2023-12-21 ENCOUNTER — TREATMENT (OUTPATIENT)
Dept: PHYSICAL THERAPY | Facility: CLINIC | Age: 68
End: 2023-12-21
Payer: COMMERCIAL

## 2023-12-21 DIAGNOSIS — M76.891 OTHER SPECIFIED ENTHESOPATHIES OF RIGHT LOWER LIMB, EXCLUDING FOOT: ICD-10-CM

## 2023-12-21 PROCEDURE — 97110 THERAPEUTIC EXERCISES: CPT | Mod: GP

## 2023-12-21 PROCEDURE — 97112 NEUROMUSCULAR REEDUCATION: CPT | Mod: GP

## 2023-12-21 ASSESSMENT — PAIN SCALES - GENERAL: PAINLEVEL_OUTOF10: 4

## 2023-12-21 ASSESSMENT — PAIN - FUNCTIONAL ASSESSMENT: PAIN_FUNCTIONAL_ASSESSMENT: 0-10

## 2023-12-21 ASSESSMENT — PAIN DESCRIPTION - DESCRIPTORS: DESCRIPTORS: ACHING;STABBING

## 2023-12-21 NOTE — PROGRESS NOTES
Physical Therapy    Physical Therapy Progress Note     Patient Name: Charo Fleming  MRN: [id number]  Today's Date: 12/21/2023  Time Calculation  Start Time: 1001  Stop Time: 1040  Time Calculation (min): 39 min  Billing:  Treatment:   Therapeutic Exercise:  29  NMR:  10    Assessment:  PT Assessment  PT Assessment Results: Decreased strength, Decreased endurance, Impaired balance, Decreased mobility, Decreased coordination, Pain  Rehab Prognosis: Good  Evaluation/Treatment Tolerance: Patient tolerated treatment well  pt tolerated treatment well, did well with progression of blaze pods with less dynamic support. pt needs continued work on increasing tolerance with activity, hip strength, and dynamic balance. pt left session with all questions answered.     Plan:  OP PT Plan  Treatment/Interventions: Biofeedback, Cryotherapy, Education/ Instruction, Gait training, Manual therapy, Neuromuscular re-education, Self care/ home management, Therapeutic activities, Therapeutic exercises  PT Plan: Skilled PT, Other (Comment) (Continue to progress hip strengthening and tolerance with activity)  PT Frequency: 2 times per week  Duration: 4  Rehab Potential: Good  Plan of Care Agreement: Patient  Visit #14  Insurance Reviewed (per information provided by  pre-cert team)  Authorization required:  No   Preferred Name:  Charo    Current Problem  1. Other specified enthesopathies of right lower limb, excluding foot  Follow Up In Physical Therapy          General  PT  Visit  PT Received On: 12/21/23  Response to Previous Treatment: Patient with no complaints from previous session.  General  Reason for Referral: R Hip Pain  Referred By: MD Brett  Past Medical History Relevant to Rehab: CA Melanoma, HTN, Asthma, CVA, HA, Migraines,  Preferred Learning Style: visual, verbal, written  General Comment: No falls since last visit    Subjective    Precautions  Precautions  Medical Precautions: No known precautions/limitation  Pain  Pain  Assessment  Pain Assessment: 0-10  Pain Score: 4  Pain Location: Back  Pain Orientation: Lower  Pain Descriptors: Aching, Stabbing  Pain Frequency: Intermittent    Objective   Posture  Postural Control  Posture Comment: Rounded shoulders, fwd head, increased thoracic kyphosis    Gait: Ambulates for 75+ft with a moderate antalgic gait; minimal decreased allie, decreased heel strike and push off    Treatments:  Therapeutic Exercise  Therapeutic Exercise Performed: Yes  Therapeutic Exercise Activity 1: Nustep work level 5 to increase functional mobility and increase tolerance with activity; 8 minutes  Therapeutic Exercise Activity 3: Adductor Ball Squeeze + LAQ x15 BL  Therapeutic Exercise Activity 4: SLR BL 2x15 with 2# AW  Therapeutic Exercise Activity 5: Hip circles CW/CCW x10 each BL  Therapeutic Exercise Activity 6: Long Sit Hip Flexion BL 3x15; red ball as tactile cue  Therapeutic Exercise Activity 7: Hip Hike 8in Box BL 2x15  Therapeutic Exercise Activity 8: Mini Squats at // bar 2 x10  Therapeutic Exercise Activity 9: Stair HS Stretch BL 2x30s  Therapeutic Exercise Activity 10: Gastroc/Soleus Stretch BL x30s Slant Board  Therapeutic Exercise Activity 11: Fwd Knee Bend Stretch BL 2x30s    Balance/Neuromuscular Re-Education  Balance/Neuromuscular Re-Education Activity Performed: Yes  Balance/Neuromuscular Re-Education Activity 1: Blaze Pods: Open Triangle; 5 Pods: R SLS; 3x30s with 20s breaks in btw sets; close supervision; max taps  Balance/Neuromuscular Re-Education Activity 2: Blaze Pods: Open Triangle; 5 Pods: L SLS; 3x30s with 20s breaks in btw sets; close supervision; max taps  Balance/Neuromuscular Re-Education Activity 3: Blaze Pods: Open Triangle; 5 Pods: L/R Tapping; 3x30s with 20s breaks in btw sets; close supervision; max taps; Dual task 2 colors  Balance/Neuromuscular Re-Education Activity 4: Supine Bridges on Bosu 2x10    OP EDUCATION:  Outpatient Education  Individual(s) Educated:  Patient  Education Provided: Anatomy, Fall Risk, Body Mechanics, Home Exercise Program, POC, Posture  Risk and Benefits Discussed with Patient/Caregiver/Other: yes  Patient/Caregiver Demonstrated Understanding: yes  Plan of Care Discussed and Agreed Upon: yes  Patient Response to Education: Patient/Caregiver Verbalized Understanding of Information, Patient/Caregiver Performed Return Demonstration of Exercises/Activities, Patient/Caregiver Asked Appropriate Questions

## 2023-12-27 ENCOUNTER — TREATMENT (OUTPATIENT)
Dept: PHYSICAL THERAPY | Facility: CLINIC | Age: 68
End: 2023-12-27
Payer: COMMERCIAL

## 2023-12-27 DIAGNOSIS — M76.891 OTHER SPECIFIED ENTHESOPATHIES OF RIGHT LOWER LIMB, EXCLUDING FOOT: ICD-10-CM

## 2023-12-27 PROCEDURE — 97110 THERAPEUTIC EXERCISES: CPT | Mod: GP,CQ

## 2023-12-27 PROCEDURE — 97112 NEUROMUSCULAR REEDUCATION: CPT | Mod: GP,CQ

## 2023-12-27 ASSESSMENT — PAIN DESCRIPTION - DESCRIPTORS: DESCRIPTORS: ACHING

## 2023-12-27 ASSESSMENT — PAIN - FUNCTIONAL ASSESSMENT: PAIN_FUNCTIONAL_ASSESSMENT: 0-10

## 2023-12-27 ASSESSMENT — PAIN SCALES - GENERAL: PAINLEVEL_OUTOF10: 3

## 2023-12-27 NOTE — PROGRESS NOTES
Physical Therapy    Physical Therapy Progress Note     Patient Name: Charo Fleming  MRN: [id number]  Today's Date: 12/27/2023  Time Calculation  Start Time: 0835  Stop Time: 0920  Time Calculation (min): 45 min  Billing:  Treatment:   Therapeutic Exercise:  30  NMR:  15    Assessment:   Patient challenged and improving with blaze pod target, balance, and agility.  Improved accuracy and speed with L. LE in SLS.  Challenged with long sit SLR.  Demonstration for correct technique with mini squats and hip hikes.  Fatigued at end of session.  Positive response post session.   Skilled Care:  Therapeutic exercise, NMR, patient education    Plan:   Continue per POC.   Add cable column walking next visit.     Visit #15  Insurance Reviewed (per information provided by  pre-cert team)  Authorization required:  No   Preferred Name:  Charo    Current Problem  1. Other specified enthesopathies of right lower limb, excluding foot  Follow Up In Physical Therapy          General     General  Reason for Referral: R Hip Pain  Referred By: MD Brett  Past Medical History Relevant to Rehab: CA Melanoma, HTN, Asthma, CVA, HA, Migraines,    Subjective    The patient reports she was on the floor a lot during [place], but can tell she is improving with prolonged standing and stair activity.    Precautions   Medical Precautions: No known precautions/limitation     Pain  Pain Assessment  Pain Assessment: 0-10  Pain Score: 3  Pain Location: Back  Pain Orientation: Lower  Pain Descriptors: Aching  Pain Frequency: Intermittent    Objective   Blaze Pod L. LE Score:  26, 69, 30                    R. LE Score:  24, 27, 31     Treatments:  Therapeutic Exercise  Therapeutic Exercise Performed: Yes  Therapeutic Exercise Activity 1: Nustep work level 5 to increase functional mobility and increase tolerance with activity; 8 minutes  Therapeutic Exercise Activity 2: Supine Bridges SL x10  Therapeutic Exercise Activity 3: Adductor Ball Squeeze +  LAQ x15 BL  Therapeutic Exercise Activity 4: SLR BL 2x15 with 2# AW  Therapeutic Exercise Activity 5: Hip circles CW/CCW x10 each BL  Therapeutic Exercise Activity 6: Long Sit Hip Flexion BL 3x15; red ball as tactile cue  Therapeutic Exercise Activity 7: Hip Hike 8in Box BL 2x15  Therapeutic Exercise Activity 8: Mini Squats at // bar 2 x10  Therapeutic Exercise Activity 9: Stair HS Stretch BL 2x30s  Therapeutic Exercise Activity 10: Gastroc/Soleus Stretch BL x30s Slant Board  Therapeutic Exercise Activity 11: Fwd Knee Bend Stretch BL 2x30s    Balance/Neuromuscular Re-Education  Balance/Neuromuscular Re-Education Activity Performed: Yes   Blaze Pods: Open Triangle; 5 Pods: R SLS; 3x30s with 20s breaks in btw sets; close supervision; max taps  Balance/Neuromuscular Re-Education Activity 2: Blaze Pods: Open Triangle; 5 Pods: L SLS; 3x30s with 20s breaks in btw sets; close supervision; max taps  : Supine Bridges on Bosu 2x10   OP EDUCATION:  V/c for correct technique and posture with exercises.

## 2024-01-02 ENCOUNTER — TREATMENT (OUTPATIENT)
Dept: PHYSICAL THERAPY | Facility: CLINIC | Age: 69
End: 2024-01-02
Payer: COMMERCIAL

## 2024-01-02 DIAGNOSIS — M76.891 OTHER SPECIFIED ENTHESOPATHIES OF RIGHT LOWER LIMB, EXCLUDING FOOT: Primary | ICD-10-CM

## 2024-01-02 PROCEDURE — 97110 THERAPEUTIC EXERCISES: CPT | Mod: GP

## 2024-01-02 PROCEDURE — 97530 THERAPEUTIC ACTIVITIES: CPT | Mod: GP

## 2024-01-02 ASSESSMENT — PAIN DESCRIPTION - DESCRIPTORS: DESCRIPTORS: ACHING

## 2024-01-02 ASSESSMENT — PAIN SCALES - GENERAL: PAINLEVEL_OUTOF10: 2

## 2024-01-02 ASSESSMENT — PAIN - FUNCTIONAL ASSESSMENT: PAIN_FUNCTIONAL_ASSESSMENT: 0-10

## 2024-01-02 NOTE — PROGRESS NOTES
Physical Therapy    Physical Therapy Discharge/Re-Assessment     Patient Name: Charo Fleming  MRN: [id number]  Today's Date: 1/2/2024  Time Calculation  Start Time: 0915  Stop Time: 0953  Time Calculation (min): 38 min  Billing:  Treatment:   Therapeutic Exercise:  15  Therapeutic Activity:  23      Assessment:  PT Assessment  PT Assessment Results: Pain  Rehab Prognosis: Good  Evaluation/Treatment Tolerance: Patient tolerated treatment well  Since initial evaluation, pt has met majority of  therapeutic goals. pt is discharged from skilled PT due to self reporting 90% improvement since initial evaluation and returning to functional baseline. pt verbally agrees to DC at this time. pt left session with all questions answered.   Plan: DC   OP PT Plan  PT Plan: No Additional PT interventions required at this time  Rehab Potential: Good  Plan of Care Agreement: Patient  Visit #16  Insurance Reviewed (per information provided by  pre-cert team)  Authorization required:  No   Preferred Name:  Charo    Current Problem  1. Other specified enthesopathies of right lower limb, excluding foot            General  PT  Visit  PT Received On: 01/02/24  Response to Previous Treatment: Patient with no complaints from previous session.  General  Reason for Referral: R Hip Pain  Referred By: MD Brett  Past Medical History Relevant to Rehab: CA Melanoma, HTN, Asthma, CVA, HA, Migraines,  Preferred Learning Style: visual, verbal, written  General Comment: R hip pain has been better - attests it to less grandkid time; no falls since last visit. Reports Sciatica is not bad at all. Reports she feels she is 90% better since the start of PT    Subjective    Precautions  Precautions  Medical Precautions: No known precautions/limitation  Pain  Pain Assessment  Pain Assessment: 0-10  Pain Score: 2  Pain Location: Back  Pain Orientation: Lower  Pain Descriptors: Aching  Pain Frequency: Intermittent    Objective   Posture  Postural  Control  Posture Comment: Rounded shoulders, fwd head, increased thoracic kyphosis    Extremity/Trunk Assessment  RLE   RLE : Exceptions to WFL  Strength RLE  R Hip Flexion: 4+/5 --> same  R Hip Extension: 4/5 -->same  R Hip ABduction: 4/5 -->5/5  R Hip ADduction: 4/5 -->5/5  R Knee Flexion: 4+/5 -->5/5  R Knee Extension: 5/5  R Ankle Dorsiflexion: 5/5  R Ankle Plantar Flexion: 5/5  LLE   LLE : Exceptions to WFL  Strength LLE  L Hip Flexion: 4+/5 -->same  L Hip Extension: 4/5 --> 4+/5  L Hip ABduction: 4/5 -->5/5  L Hip ADduction: 4/5 -->5/5  L Knee Flexion: 4+/5 -->5/5  L Knee Extension: 5/5  L Ankle Dorsiflexion: 5/5  L Ankle Plantar Flexion: 5/5     HIP  Hip Palpation/Joint Mobility Assessment  Palpation / Joint Mobility Comment: No tenderness with palpation to BL hip musculature; greater trochanter.     Lumbar AROM WFL unless documented below  Lumbar AROM WFL: no  Lumbar flexion: (60°): WNL; Pain with flexion of L/S in the L Hip;   Lumbar extension (25°): WNL Motion: No pain   Lumbar rotation right (30°): WNL; no pain   Lumbar roation left (30°): WNL; no pain  Lumbar sidebend right (25°): Pain in the low back mid region  Lumbar sidebend left (25°): WNL; pain in the low back mid region  Hip AROM WFL unless documented below  Hip AROM WFL: yes  Hip PROM WFL unless documented below  Hip PROM WFL: yes  Specific Lower Extremity MMT WFL unless documented below  Specific Lower Extremity MMT WFL: no  R Iliopsoas: (5/5): 4+/5 -->same  L Iliopsoas: (5/5): 4+/5 -->same   R Gluteals (prone): (5/5): 4/5--> 4+/5  L Gluteals (prone): (5/5): 4/5 --> same   R Gluteals (sidelying): (5/5): 4/5   L Gluteals (sidelying): (5/5): 4/5  R Hip External Rotation: (5/5): 4/5 -->5/5  L Hip External Rotation: (5/5): 4/5 -->5/5  R knee flexion: (5/5): 4+/5 -->5/5  L knee flexion: (5/5): 4+/5 -->5/5  R knee extension: (5/5): 5/5 -->same  L knee extension: (5/5): 5/5 -->same      Special Tests Negative unless documented below  Special Tests  Negative: no  Supine SLR: (Negative): (+) on L LE >90 degrees --> NEGATIVE BL   NASRA: (Negative): (-) BL  Other: Scour Test (-) BL    Outcome Measures:  FGA - Functional Gait Assessment  Gait level surface: 3  Change in gait speed: 3  Gait with horizontal head turns: 3  Gait with vertical head turns: 3  Gait and pivot turn: 3  Step over obstacle: 3  Gait with narrow base of support: 2  Gait with eyes closed: 2  Ambulating backwards: 3  Steps: 3  FGA Total Score: 28    Timed Up and Go Test  TUG Comment: 6.12s no AD    Other Measures  5x Sit to Stand: 8.5s no UE assist; standardized chair  Functional Gait Assessment (FGA): 28/30  Treatments:  Therapeutic Exercise  Therapeutic Exercise Performed: Yes  Therapeutic Exercise Activity 1: Nustep work level 5 to increase functional mobility and increase tolerance with activity; 10 minutes  Therapeutic Exercise Activity 9: Stair HS Stretch BL x30s  Therapeutic Exercise Activity 10: Gastroc/Soleus Stretch BL x30s Slant Board  Therapeutic Exercise Activity 11: Fwd Knee Bend Stretch BL 2x30s    Therapeutic Activity  Therapeutic Activity Performed: Yes  Therapeutic Activity 1: Re-Assessment  1. Repeated sit to/from stands from standardized chair height. Required VC for no use of hands, nose over toes, full upright stand from chair, use of anterior shift with fwd momentum and eccentric control into chair.  2. Functional Performance via gait analysis of TUG/FGA: Verbal cues for sequencing/positioning. 100+ft at SBA.   3. Functional mobility via AROM/PROM of LE; Verbal cues for sequencing/positioning.  4. Functional Performance via MMT of LE: Hip, knee, ankle; Verbal cues for sequencing/positioning.   5. Functional performance via Stairs: Ascends/Descends 4 6in steps with no use of HR     OP EDUCATION:  Outpatient Education  Individual(s) Educated: Patient  Education Provided: Anatomy, Fall Risk, Body Mechanics, Home Exercise Program, POC, Posture  Risk and Benefits Discussed with  Patient/Caregiver/Other: yes  Patient/Caregiver Demonstrated Understanding: yes  Plan of Care Discussed and Agreed Upon: yes  Patient Response to Education: Patient/Caregiver Verbalized Understanding of Information, Patient/Caregiver Performed Return Demonstration of Exercises/Activities, Patient/Caregiver Asked Appropriate Questions    Goals: 10/24/23; Re-Check 11/21/23; DC 1/2/23  STG: pt will be independent in HEP & symptom management     LTGs:  Pain: pt will demonstrate a 2 pt improvement for hip pain on the VAS scale, allowing for improved tolerance to perform daily functional activities. (ADEQUATE FOR DC)     ROM: pt will demonstrate no losses in hip AROM without onset of pain, allowing for a normal gait pattern. (MET)     Strength: Pt will improve B LE Strength to >/= 4+/5 to increase functional performance, tolerance to activity, and enhancing pt safety to particpate in ADLs and IADLs. (ADEQUATE FOR DC)      Gait: pt will demonstrate normal mechanics without pain during gait and performance of stairs, allowing for pt to return to navigating the community. (MET)      LEFS: pt will improve LEFS score by at least 9 points (minimal clinically important difference) in order to reflect increased ease in completing ADL's/IADL's.  Baseline 36/80  --> 60/80 (ADEQUATE FOR DC)      FGA: pt will increase FGA to >/= 23/30 to decrease fall risk and improve safety/IND at home. The Functional Gait Assessment(FGA) is 10-item test that assesses dynamic balance and postural stability during gait.  It is used to assessed the following diagnoses: Stroke, Parkinson's Disease, SCI's, Vestibular Disorders, and Geriatrics.  A score of < 22/30 indicates Increased fall risk. Baseline 28/30 -->29/30 (ADEQUATE FOR DC)      TUG: pt will complete TUG within 12 seconds or less (indicative of higher fall risk) in order to INC balance and enhance safety during ADLs/ IADLs. (> or equal to 12 seconds indicates high risk for falls in older  adults. 11-20 seconds is normal for the frail and elderly.) OR MCID 3.4s Baseline 5.83sec --> 5.6s (11/21/23)  --> 6.12s 1/2/24 (ADEQUATE FOR DC)      5xSTS: pt will perform 5x sit to  < 15 seconds to decrease fall risk. OR MCID 2.3s Baseline 10.97sec --> 8.77s (11/21/23) --> 8.5s (1/2/24)     Stairs: pt will ascend/descend step over step (6in step) with proper gait mechanics and use of <1HR to promote functional mobility and improve functional performance. (MET)

## 2024-01-06 ENCOUNTER — LAB REQUISITION (OUTPATIENT)
Dept: LAB | Facility: HOSPITAL | Age: 69
End: 2024-01-06
Payer: COMMERCIAL

## 2024-01-06 DIAGNOSIS — N39.0 URINARY TRACT INFECTION, SITE NOT SPECIFIED: ICD-10-CM

## 2024-01-06 PROCEDURE — 87086 URINE CULTURE/COLONY COUNT: CPT

## 2024-01-08 LAB — BACTERIA UR CULT: NORMAL

## 2024-12-08 NOTE — PROGRESS NOTES
Subjective   Patient ID: Charo Fleming is a 69 y.o. female who presents for EVALUATION OF BILATERAL FLANK PAIN.  PAIN IS 4-5 /10, WAXING AN WANING.   SXS PRESENT FOR 3-4 WEEKS . PT HAD A UTI IN THE RECENT PAST [id number] BY DR BAXTER  HPI  Are you experiencing:  Burning on urination --NO  Pain on urination  -- NO  Urinary frequency -- NO  Urinary urgency --YES  Urge incontinence -- OCC  Urinary stress incontinence  -- NO  Number of pads used per day -- NONE  Eneuresis -- NO  Nocturia-- 2 -3 X ON AVG  Hematuria -- NO  Hesitancy --NO  Post void fullness --  OCC     Review of Systems  General-- No C/O fever or chills  Head-- No C/O Dizziness  Eyes-- NO  C/O blurry or double vision  Ears-- No C/O hearing loss  Neck-- Supple  Chest-- No C/O pain or discomfort  Lungs-- No C/O shortness of breath  Abdomen-- No C/O  pain or discomfort, PT C/O  nausea NO  vomiting WHEN THE PAIN STARTS   Back-- No C/O back pain or discomfort  Extremities-- No C/O swelling or pain    Objective   Physical Exam    General-- well developed, well nourished in NAD  Head-- normal cephalic, atraumatic  Eyes-- PERRL, EOM'S FROM,  no  jaundice  Neck-- Supple, without masses  Chest-- Normal bony structure  Abdomen-- soft, non tender, liver spleen not palpable . No supra pubic masses  Back-- no flank masses palpable, MILE RIGHT SIDED  CVA tenderness on palpation -- NO PAIN ON PERCUSSION OF EITHER SIDES   Lymph nodes-- No inguinal lymphadenopathy noted  Extremities -- Normal muscle mass and tone for the patients age  Neurological-- oriented times three    URINALYSIS DIPSTICK-- MOD LEUKOCYTES    Assessment/Plan   A:  BILATERAL FLANK PAIN ? 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 Several  small nonobstructing left renal calculi measuring up to 4 mm in  diameter. No ureteral calculi bilaterally. No hydroureteronephrosis  bilaterally.      Colonic diverticulosis without acute diverticulitis.      Normal appendix. No bowel obstruction.      Diffuse fatty infiltration of the liver.  A:  H/O BILATERAL FLANK PAIN ? MUSCULO-SKELETAL IN NATURE-- NOT RELATED TO HER SMALL KIDNEY STONES  ABOVE DISCUSSED IN DETAIL WITH THE PT   ALL QUESTIONS ANSWERED     P:  CONTINUE CONSERVATIVE THERAPY FOR NOW.  F/U ON AN OPEN DOOR POLICY

## 2024-12-09 ENCOUNTER — OFFICE VISIT (OUTPATIENT)
Dept: UROLOGY | Facility: CLINIC | Age: 69
End: 2024-12-09
Payer: COMMERCIAL

## 2024-12-09 VITALS
DIASTOLIC BLOOD PRESSURE: 84 MMHG | HEART RATE: 88 BPM | WEIGHT: 193 LBS | HEIGHT: 64 IN | TEMPERATURE: 98.2 F | SYSTOLIC BLOOD PRESSURE: 131 MMHG | BODY MASS INDEX: 32.95 KG/M2 | RESPIRATION RATE: 16 BRPM

## 2024-12-09 DIAGNOSIS — R35.1 NOCTURIA: ICD-10-CM

## 2024-12-09 DIAGNOSIS — R39.15 URGENCY OF MICTURITION: Primary | ICD-10-CM

## 2024-12-09 DIAGNOSIS — N39.41 URGENCY INCONTINENCE: ICD-10-CM

## 2024-12-09 DIAGNOSIS — R10.9 FLANK PAIN: ICD-10-CM

## 2024-12-09 DIAGNOSIS — C43.9 MALIGNANT MELANOMA, UNSPECIFIED SITE (MULTI): ICD-10-CM

## 2024-12-09 DIAGNOSIS — N20.0 KIDNEY STONES: ICD-10-CM

## 2024-12-09 LAB
APPEARANCE UR: CLEAR
BILIRUB UR STRIP.AUTO-MCNC: NEGATIVE MG/DL
COLOR UR: ABNORMAL
GLUCOSE UR STRIP.AUTO-MCNC: NORMAL MG/DL
KETONES UR STRIP.AUTO-MCNC: NEGATIVE MG/DL
LEUKOCYTE ESTERASE UR QL STRIP.AUTO: ABNORMAL
NITRITE UR QL STRIP.AUTO: NEGATIVE
PH UR STRIP.AUTO: 5.5 [PH]
POC APPEARANCE, URINE: CLEAR
POC BILIRUBIN, URINE: NEGATIVE
POC BLOOD, URINE: ABNORMAL
POC COLOR, URINE: YELLOW
POC GLUCOSE, URINE: NEGATIVE MG/DL
POC KETONES, URINE: NEGATIVE MG/DL
POC LEUKOCYTES, URINE: ABNORMAL
POC NITRITE,URINE: NEGATIVE
POC PH, URINE: 6 PH
POC PROTEIN, URINE: NEGATIVE MG/DL
POC SPECIFIC GRAVITY, URINE: 1.01
POC UROBILINOGEN, URINE: 0.2 EU/DL
PROT UR STRIP.AUTO-MCNC: NEGATIVE MG/DL
RBC # UR STRIP.AUTO: NEGATIVE /UL
RBC #/AREA URNS AUTO: NORMAL /HPF
SP GR UR STRIP.AUTO: 1.01
UROBILINOGEN UR STRIP.AUTO-MCNC: NORMAL MG/DL
WBC #/AREA URNS AUTO: NORMAL /HPF
WBC CLUMPS #/AREA URNS AUTO: NORMAL /HPF

## 2024-12-09 PROCEDURE — 1159F MED LIST DOCD IN RCRD: CPT | Performed by: UROLOGY

## 2024-12-09 PROCEDURE — 87086 URINE CULTURE/COLONY COUNT: CPT | Mod: PARLAB | Performed by: UROLOGY

## 2024-12-09 PROCEDURE — 81003 URINALYSIS AUTO W/O SCOPE: CPT | Mod: QW | Performed by: UROLOGY

## 2024-12-09 PROCEDURE — 1160F RVW MEDS BY RX/DR IN RCRD: CPT | Performed by: UROLOGY

## 2024-12-09 PROCEDURE — 99204 OFFICE O/P NEW MOD 45 MIN: CPT | Performed by: UROLOGY

## 2024-12-09 PROCEDURE — 1126F AMNT PAIN NOTED NONE PRSNT: CPT | Performed by: UROLOGY

## 2024-12-09 PROCEDURE — 3075F SYST BP GE 130 - 139MM HG: CPT | Performed by: UROLOGY

## 2024-12-09 PROCEDURE — 99214 OFFICE O/P EST MOD 30 MIN: CPT | Performed by: UROLOGY

## 2024-12-09 PROCEDURE — 81001 URINALYSIS AUTO W/SCOPE: CPT | Performed by: UROLOGY

## 2024-12-09 PROCEDURE — 3079F DIAST BP 80-89 MM HG: CPT | Performed by: UROLOGY

## 2024-12-09 PROCEDURE — 3008F BODY MASS INDEX DOCD: CPT | Performed by: UROLOGY

## 2024-12-09 SDOH — ECONOMIC STABILITY: FOOD INSECURITY: WITHIN THE PAST 12 MONTHS, THE FOOD YOU BOUGHT JUST DIDN'T LAST AND YOU DIDN'T HAVE MONEY TO GET MORE.: NEVER TRUE

## 2024-12-09 SDOH — ECONOMIC STABILITY: FOOD INSECURITY: WITHIN THE PAST 12 MONTHS, YOU WORRIED THAT YOUR FOOD WOULD RUN OUT BEFORE YOU GOT MONEY TO BUY MORE.: NEVER TRUE

## 2024-12-09 ASSESSMENT — ENCOUNTER SYMPTOMS
DEPRESSION: 0
LOSS OF SENSATION IN FEET: 0
OCCASIONAL FEELINGS OF UNSTEADINESS: 0

## 2024-12-09 ASSESSMENT — LIFESTYLE VARIABLES
AUDIT-C TOTAL SCORE: 0
HOW MANY STANDARD DRINKS CONTAINING ALCOHOL DO YOU HAVE ON A TYPICAL DAY: PATIENT DOES NOT DRINK
HOW OFTEN DO YOU HAVE A DRINK CONTAINING ALCOHOL: NEVER
HOW OFTEN DO YOU HAVE SIX OR MORE DRINKS ON ONE OCCASION: NEVER
SKIP TO QUESTIONS 9-10: 1

## 2024-12-09 ASSESSMENT — PATIENT HEALTH QUESTIONNAIRE - PHQ9
1. LITTLE INTEREST OR PLEASURE IN DOING THINGS: NOT AT ALL
2. FEELING DOWN, DEPRESSED OR HOPELESS: NOT AT ALL
SUM OF ALL RESPONSES TO PHQ9 QUESTIONS 1 AND 2: 0

## 2024-12-09 ASSESSMENT — COLUMBIA-SUICIDE SEVERITY RATING SCALE - C-SSRS
6. HAVE YOU EVER DONE ANYTHING, STARTED TO DO ANYTHING, OR PREPARED TO DO ANYTHING TO END YOUR LIFE?: NO
1. IN THE PAST MONTH, HAVE YOU WISHED YOU WERE DEAD OR WISHED YOU COULD GO TO SLEEP AND NOT WAKE UP?: NO
2. HAVE YOU ACTUALLY HAD ANY THOUGHTS OF KILLING YOURSELF?: NO

## 2024-12-09 ASSESSMENT — PAIN SCALES - GENERAL: PAINLEVEL_OUTOF10: 0-NO PAIN

## 2024-12-09 NOTE — LETTER
December 14, 2024     Jackie West MD  [address] [facility]  [place]  [facility] [address]    Patient: Charo Fleming   YOB: 1955   Date of Visit: 12/9/2024       Dear Dr. Jackie West MD:    Thank you for referring Charo Fleming to me for evaluation. Below are my notes for this consultation.  If you have questions, please do not hesitate to call me. I look forward to following your patient along with you.       Sincerely,     Delbert Hess MD      CC: No Recipients  ______________________________________________________________________________________    Subjective  Patient ID: Charo Fleming is a 69 y.o. female who presents for EVALUATION OF BILATERAL FLANK PAIN.  PAIN IS 4-5 /10, WAXING AN WANING.   SXS PRESENT FOR 3-4 WEEKS . PT HAD A UTI IN THE RECENT PAST [id number] BY DR BAXTER  HPI  Are you experiencing:  Burning on urination --NO  Pain on urination  -- NO  Urinary frequency -- NO  Urinary urgency --YES  Urge incontinence -- OCC  Urinary stress incontinence  -- NO  Number of pads used per day -- NONE  Eneuresis -- NO  Nocturia-- 2 -3 X ON AVG  Hematuria -- NO  Hesitancy --NO  Post void fullness --  OCC     Review of Systems  General-- No C/O fever or chills  Head-- No C/O Dizziness  Eyes-- NO  C/O blurry or double vision  Ears-- No C/O hearing loss  Neck-- Supple  Chest-- No C/O pain or discomfort  Lungs-- No C/O shortness of breath  Abdomen-- No C/O  pain or discomfort, PT C/O  nausea NO  vomiting WHEN THE PAIN STARTS   Back-- No C/O back pain or discomfort  Extremities-- No C/O swelling or pain    Objective   Physical Exam    General-- well developed, well nourished in NAD  Head-- normal cephalic, atraumatic  Eyes-- PERRL, EOM'S FROM,  no  jaundice  Neck-- Supple, without masses  Chest-- Normal bony structure  Abdomen-- soft, non tender, liver spleen not palpable . No supra pubic masses  Back-- no flank masses palpable, MILE RIGHT SIDED  CVA tenderness on palpation -- NO  PAIN ON PERCUSSION OF EITHER SIDES   Lymph nodes-- No inguinal lymphadenopathy noted  Extremities -- Normal muscle mass and tone for the patients age  Neurological-- oriented times three    URINALYSIS DIPSTICK-- MOD LEUKOCYTES    Assessment/Plan   A:  BILATERAL FLANK PAIN ? ETIOLOGY     REMOTE H/O MALIGNANT, MELANOMA -- LEFT CALF AND LEFT ARM -- WAS FOUND  IN THE SENTINEL NODE -- PT HAD A YEAR OF INTERFERON 2012 -- DOING WELL  OVERALL     P:  SCHEDULE:  CAT SCAN OF THE ABD AND PELVIS WITHOUT IV CONTRAST- PT TO CALL FOR THE REPORT   URINE FOR C & S   Delbert Hess MD 12/08/24 1:58 PM     ADDENDUM: 12-9-24  Urine Culture No growth

## 2024-12-09 NOTE — LETTER
December 31, 2024     Jackie West MD  [address] [facility]  [place] [address]  [facility] [address]    Patient: Charo Fleming   YOB: 1955   Date of Visit: 12/9/2024       Dear Dr. Jackie West MD:    Thank you for referring Charo Fleming to me for evaluation. Below are my notes for this consultation.  If you have questions, please do not hesitate to call me. I look forward to following your patient along with you.       Sincerely,     Delbert Hess MD      CC: No Recipients  ______________________________________________________________________________________    Subjective  Patient ID: Charo Fleming is a 69 y.o. female who presents for EVALUATION OF BILATERAL FLANK PAIN.  PAIN IS 4-5 /10, WAXING AN WANING.   SXS PRESENT FOR 3-4 WEEKS . PT HAD A UTI IN THE RECENT PAST [id number] BY DR BAXTER  HPI  Are you experiencing:  Burning on urination --NO  Pain on urination  -- NO  Urinary frequency -- NO  Urinary urgency --YES  Urge incontinence -- OCC  Urinary stress incontinence  -- NO  Number of pads used per day -- NONE  Eneuresis -- NO  Nocturia-- 2 -3 X ON AVG  Hematuria -- NO  Hesitancy --NO  Post void fullness --  OCC     Review of Systems  General-- No C/O fever or chills  Head-- No C/O Dizziness  Eyes-- NO  C/O blurry or double vision  Ears-- No C/O hearing loss  Neck-- Supple  Chest-- No C/O pain or discomfort  Lungs-- No C/O shortness of breath  Abdomen-- No C/O  pain or discomfort, PT C/O  nausea NO  vomiting WHEN THE PAIN STARTS   Back-- No C/O back pain or discomfort  Extremities-- No C/O swelling or pain    Objective   Physical Exam    General-- well developed, well nourished in NAD  Head-- normal cephalic, atraumatic  Eyes-- PERRL, EOM'S FROM,  no  jaundice  Neck-- Supple, without masses  Chest-- Normal bony structure  Abdomen-- soft, non tender, liver spleen not palpable . No supra pubic masses  Back-- no flank masses palpable, MILE RIGHT SIDED  CVA tenderness on palpation -- NO  PAIN ON PERCUSSION OF EITHER SIDES   Lymph nodes-- No inguinal lymphadenopathy noted  Extremities -- Normal muscle mass and tone for the patients age  Neurological-- oriented times three    URINALYSIS DIPSTICK-- MOD LEUKOCYTES    Assessment/Plan   A:  BILATERAL FLANK PAIN ? 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 Several  small nonobstructing left renal calculi measuring up to 4 mm in  diameter. No ureteral calculi bilaterally. No hydroureteronephrosis  bilaterally.      Colonic diverticulosis without acute diverticulitis.      Normal appendix. No bowel obstruction.      Diffuse fatty infiltration of the liver.  A:  H/O BILATERAL FLANK PAIN ? MUSCULO-SKELETAL IN NATURE-- NOT RELATED TO HER SMALL KIDNEY STONES  ABOVE DISCUSSED IN DETAIL WITH THE PT   ALL QUESTIONS ANSWERED     P:  CONTINUE CONSERVATIVE THERAPY FOR NOW.  F/U ON AN OPEN DOOR POLICY

## 2024-12-10 LAB — HOLD SPECIMEN: NORMAL

## 2024-12-11 LAB — BACTERIA UR CULT: NO GROWTH

## 2024-12-26 ENCOUNTER — HOSPITAL ENCOUNTER (OUTPATIENT)
Dept: RADIOLOGY | Facility: CLINIC | Age: 69
Discharge: HOME | End: 2024-12-26
Payer: COMMERCIAL

## 2024-12-26 DIAGNOSIS — R10.9 FLANK PAIN: ICD-10-CM

## 2024-12-26 PROCEDURE — 74176 CT ABD & PELVIS W/O CONTRAST: CPT

## 2025-07-01 ENCOUNTER — OFFICE VISIT (OUTPATIENT)
Dept: ORTHOPEDIC SURGERY | Facility: CLINIC | Age: 70
End: 2025-07-01
Payer: MEDICARE

## 2025-07-01 ENCOUNTER — HOSPITAL ENCOUNTER (OUTPATIENT)
Dept: RADIOLOGY | Facility: CLINIC | Age: 70
Discharge: HOME | End: 2025-07-01
Payer: MEDICARE

## 2025-07-01 DIAGNOSIS — G89.29 CHRONIC PAIN OF BOTH KNEES: ICD-10-CM

## 2025-07-01 DIAGNOSIS — M25.561 CHRONIC PAIN OF BOTH KNEES: ICD-10-CM

## 2025-07-01 DIAGNOSIS — M17.0 PRIMARY OSTEOARTHRITIS OF BOTH KNEES: ICD-10-CM

## 2025-07-01 DIAGNOSIS — M25.562 CHRONIC PAIN OF BOTH KNEES: ICD-10-CM

## 2025-07-01 PROCEDURE — 1160F RVW MEDS BY RX/DR IN RCRD: CPT | Performed by: ORTHOPAEDIC SURGERY

## 2025-07-01 PROCEDURE — 73564 X-RAY EXAM KNEE 4 OR MORE: CPT | Mod: 50

## 2025-07-01 PROCEDURE — 1159F MED LIST DOCD IN RCRD: CPT | Performed by: ORTHOPAEDIC SURGERY

## 2025-07-01 PROCEDURE — 99203 OFFICE O/P NEW LOW 30 MIN: CPT | Performed by: ORTHOPAEDIC SURGERY

## 2025-07-02 NOTE — PROGRESS NOTES
69-year-old is seen with bilateral knee pain for the past 3 months.  Right bothers her more than the left.  Pain is worse with going up and down stairs getting up and down from a chair in and out of a car.  Ibuprofen 600 mg and Tylenol 1000 mg have provided some relief.  Ice and topical ointments have helped.  She has a past history of a reaction to a cortisone injection in the left knee.  She has past history of right knee arthroscopy with Dr. Charles Lopresti.    Pleasant and no acute distress. Walks with a antalgic gait. Stands with varus alignment of both knees. Right knee range of motion is 5-110°. There is a mild effusion. The knee is stable to varus and valgus stress Lachman and posterior drawer. There is generalized tenderness. Left knee range of motion is 5-110°. There is a mild effusion. The knee is stable to varus and valgus stress Lachman and posterior drawer. There is generalized tenderness. Both lower extremities are well perfused the skin is intact and muscle tone is adequate.    Multiple x-ray views of the right knee and multiple x-ray views of the left knee are personally reviewed and there is advanced degenerative changes involving both knees.    A detailed discussion about knee arthritis was done.  Treatment options were reviewed.  She will perform physical therapy.  She can use ibuprofen and Tylenol.  She will avoid cortisone given her past history of a reaction that resulted in a emergency room visit.  Viscosupplementation could be done.  At some point knee replacement would be the definitive treatment.

## 2025-07-29 ENCOUNTER — APPOINTMENT (OUTPATIENT)
Dept: PHYSICAL THERAPY | Facility: CLINIC | Age: 70
End: 2025-07-29
Payer: MEDICARE

## 2025-08-14 ENCOUNTER — EVALUATION (OUTPATIENT)
Dept: PHYSICAL THERAPY | Facility: CLINIC | Age: 70
End: 2025-08-14
Payer: MEDICARE

## 2025-08-14 DIAGNOSIS — M17.0 BILATERAL PRIMARY OSTEOARTHRITIS OF KNEE: Primary | ICD-10-CM

## 2025-08-14 PROBLEM — M17.2 BILATERAL POST-TRAUMATIC OSTEOARTHRITIS OF KNEE: Status: ACTIVE | Noted: 2025-08-14

## 2025-08-14 PROCEDURE — 97161 PT EVAL LOW COMPLEX 20 MIN: CPT | Mod: GP | Performed by: PHYSICAL THERAPIST

## 2025-08-14 PROCEDURE — 97112 NEUROMUSCULAR REEDUCATION: CPT | Mod: GP | Performed by: PHYSICAL THERAPIST

## 2025-08-26 ENCOUNTER — TREATMENT (OUTPATIENT)
Dept: PHYSICAL THERAPY | Facility: CLINIC | Age: 70
End: 2025-08-26
Payer: MEDICARE

## 2025-08-26 DIAGNOSIS — M17.0 BILATERAL PRIMARY OSTEOARTHRITIS OF KNEE: Primary | ICD-10-CM

## 2025-08-26 PROCEDURE — 97110 THERAPEUTIC EXERCISES: CPT | Mod: GP | Performed by: PHYSICAL THERAPIST

## 2025-08-26 PROCEDURE — 97112 NEUROMUSCULAR REEDUCATION: CPT | Mod: GP | Performed by: PHYSICAL THERAPIST

## 2025-08-28 ENCOUNTER — TREATMENT (OUTPATIENT)
Dept: PHYSICAL THERAPY | Facility: CLINIC | Age: 70
End: 2025-08-28
Payer: MEDICARE

## 2025-08-28 DIAGNOSIS — M17.0 BILATERAL PRIMARY OSTEOARTHRITIS OF KNEE: Primary | ICD-10-CM

## 2025-08-28 PROCEDURE — 97112 NEUROMUSCULAR REEDUCATION: CPT | Mod: GP | Performed by: PHYSICAL THERAPIST

## 2025-08-28 PROCEDURE — 97110 THERAPEUTIC EXERCISES: CPT | Mod: GP | Performed by: PHYSICAL THERAPIST

## 2025-09-03 ENCOUNTER — TREATMENT (OUTPATIENT)
Dept: PHYSICAL THERAPY | Facility: CLINIC | Age: 70
End: 2025-09-03
Payer: MEDICARE

## 2025-09-03 DIAGNOSIS — M17.0 BILATERAL PRIMARY OSTEOARTHRITIS OF KNEE: Primary | ICD-10-CM

## 2025-09-03 PROCEDURE — 97112 NEUROMUSCULAR REEDUCATION: CPT | Mod: GP | Performed by: PHYSICAL THERAPIST

## 2025-09-03 PROCEDURE — 97110 THERAPEUTIC EXERCISES: CPT | Mod: GP | Performed by: PHYSICAL THERAPIST

## 2025-09-05 ENCOUNTER — TREATMENT (OUTPATIENT)
Dept: PHYSICAL THERAPY | Facility: CLINIC | Age: 70
End: 2025-09-05
Payer: MEDICARE

## 2025-09-05 DIAGNOSIS — M17.0 BILATERAL PRIMARY OSTEOARTHRITIS OF KNEE: Primary | ICD-10-CM

## 2025-09-05 PROCEDURE — 97110 THERAPEUTIC EXERCISES: CPT | Mod: GP,CQ
